# Patient Record
Sex: MALE | Race: WHITE
[De-identification: names, ages, dates, MRNs, and addresses within clinical notes are randomized per-mention and may not be internally consistent; named-entity substitution may affect disease eponyms.]

---

## 2018-07-17 ENCOUNTER — HOSPITAL ENCOUNTER (INPATIENT)
Dept: HOSPITAL 97 - ER | Age: 58
LOS: 5 days | Discharge: HOME | DRG: 246 | End: 2018-07-22
Attending: FAMILY MEDICINE | Admitting: FAMILY MEDICINE
Payer: COMMERCIAL

## 2018-07-17 VITALS — BODY MASS INDEX: 35.9 KG/M2

## 2018-07-17 DIAGNOSIS — F17.210: ICD-10-CM

## 2018-07-17 DIAGNOSIS — I30.9: ICD-10-CM

## 2018-07-17 DIAGNOSIS — Z79.82: ICD-10-CM

## 2018-07-17 DIAGNOSIS — I21.02: Primary | ICD-10-CM

## 2018-07-17 DIAGNOSIS — K80.10: ICD-10-CM

## 2018-07-17 DIAGNOSIS — E78.5: ICD-10-CM

## 2018-07-17 DIAGNOSIS — I25.5: ICD-10-CM

## 2018-07-17 DIAGNOSIS — I50.21: ICD-10-CM

## 2018-07-17 DIAGNOSIS — I11.0: ICD-10-CM

## 2018-07-17 DIAGNOSIS — B19.20: ICD-10-CM

## 2018-07-17 LAB
ALBUMIN SERPL BCP-MCNC: 3.9 G/DL (ref 3.4–5)
ALP SERPL-CCNC: 94 U/L (ref 45–117)
ALT SERPL W P-5'-P-CCNC: 90 U/L (ref 12–78)
AST SERPL W P-5'-P-CCNC: 449 U/L (ref 15–37)
BUN BLD-MCNC: 10 MG/DL (ref 7–18)
COHGB MFR BLDA: 2.3 % (ref 0–1.5)
GLUCOSE SERPLBLD-MCNC: 323 MG/DL (ref 74–106)
HCT VFR BLD CALC: 46.8 % (ref 39.6–49)
INR BLD: 0.97
LYMPHOCYTES # SPEC AUTO: 2.3 K/UL (ref 0.7–4.9)
MAGNESIUM SERPL-MCNC: 2.1 MG/DL (ref 1.8–2.4)
MCH RBC QN AUTO: 29.1 PG (ref 27–35)
MCV RBC: 85.2 FL (ref 80–100)
METHAMPHET UR QL SCN: NEGATIVE
OXYHGB MFR BLDA: 96.5 % (ref 94–97)
PMV BLD: 9.4 FL (ref 7.6–11.3)
POTASSIUM SERPL-SCNC: 4.1 MMOL/L (ref 3.5–5.1)
RBC # BLD: 5.49 M/UL (ref 4.33–5.43)
SAO2 % BLDA: 99.8 % (ref 92–98.5)
THC SERPL-MCNC: NEGATIVE NG/ML
UA DIPSTICK W REFLEX MICRO PNL UR: (no result)

## 2018-07-17 PROCEDURE — 85025 COMPLETE CBC W/AUTO DIFF WBC: CPT

## 2018-07-17 PROCEDURE — 82248 BILIRUBIN DIRECT: CPT

## 2018-07-17 PROCEDURE — 87522 HEPATITIS C REVRS TRNSCRPJ: CPT

## 2018-07-17 PROCEDURE — 71275 CT ANGIOGRAPHY CHEST: CPT

## 2018-07-17 PROCEDURE — 82805 BLOOD GASES W/O2 SATURATION: CPT

## 2018-07-17 PROCEDURE — 80048 BASIC METABOLIC PNL TOTAL CA: CPT

## 2018-07-17 PROCEDURE — 71045 X-RAY EXAM CHEST 1 VIEW: CPT

## 2018-07-17 PROCEDURE — 83036 HEMOGLOBIN GLYCOSYLATED A1C: CPT

## 2018-07-17 PROCEDURE — 85347 COAGULATION TIME ACTIVATED: CPT

## 2018-07-17 PROCEDURE — 85610 PROTHROMBIN TIME: CPT

## 2018-07-17 PROCEDURE — 82962 GLUCOSE BLOOD TEST: CPT

## 2018-07-17 PROCEDURE — 80076 HEPATIC FUNCTION PANEL: CPT

## 2018-07-17 PROCEDURE — 81003 URINALYSIS AUTO W/O SCOPE: CPT

## 2018-07-17 PROCEDURE — 93005 ELECTROCARDIOGRAM TRACING: CPT

## 2018-07-17 PROCEDURE — 76705 ECHO EXAM OF ABDOMEN: CPT

## 2018-07-17 PROCEDURE — 80074 ACUTE HEPATITIS PANEL: CPT

## 2018-07-17 PROCEDURE — 93458 L HRT ARTERY/VENTRICLE ANGIO: CPT

## 2018-07-17 PROCEDURE — 80307 DRUG TEST PRSMV CHEM ANLYZR: CPT

## 2018-07-17 PROCEDURE — 99285 EMERGENCY DEPT VISIT HI MDM: CPT

## 2018-07-17 PROCEDURE — 80053 COMPREHEN METABOLIC PANEL: CPT

## 2018-07-17 PROCEDURE — 83735 ASSAY OF MAGNESIUM: CPT

## 2018-07-17 PROCEDURE — 84484 ASSAY OF TROPONIN QUANT: CPT

## 2018-07-17 PROCEDURE — 92928 PRQ TCAT PLMT NTRAC ST 1 LES: CPT

## 2018-07-17 PROCEDURE — 80061 LIPID PANEL: CPT

## 2018-07-17 PROCEDURE — 94760 N-INVAS EAR/PLS OXIMETRY 1: CPT

## 2018-07-17 PROCEDURE — 84100 ASSAY OF PHOSPHORUS: CPT

## 2018-07-17 PROCEDURE — 93306 TTE W/DOPPLER COMPLETE: CPT

## 2018-07-17 PROCEDURE — 36415 COLL VENOUS BLD VENIPUNCTURE: CPT

## 2018-07-17 PROCEDURE — 74181 MRI ABDOMEN W/O CONTRAST: CPT

## 2018-07-17 PROCEDURE — 97163 PT EVAL HIGH COMPLEX 45 MIN: CPT

## 2018-07-17 PROCEDURE — 84443 ASSAY THYROID STIM HORMONE: CPT

## 2018-07-17 RX ADMIN — ATORVASTATIN CALCIUM SCH MG: 80 TABLET, FILM COATED ORAL at 21:21

## 2018-07-17 RX ADMIN — HUMAN INSULIN SCH UNIT: 100 INJECTION, SOLUTION SUBCUTANEOUS at 21:18

## 2018-07-17 RX ADMIN — HUMAN INSULIN SCH UNIT: 100 INJECTION, SOLUTION SUBCUTANEOUS at 17:36

## 2018-07-17 RX ADMIN — DIAZEPAM PRN MG: 5 TABLET ORAL at 16:09

## 2018-07-17 RX ADMIN — Medication SCH ML: at 21:20

## 2018-07-17 RX ADMIN — MORPHINE SULFATE PRN MG: 4 INJECTION, SOLUTION INTRAMUSCULAR; INTRAVENOUS at 13:51

## 2018-07-17 RX ADMIN — MORPHINE SULFATE PRN MG: 4 INJECTION, SOLUTION INTRAMUSCULAR; INTRAVENOUS at 21:21

## 2018-07-17 NOTE — RAD REPORT
EXAM DESCRIPTION:  RAD - Chest Single View - 7/17/2018 8:34 am

 

CLINICAL HISTORY:  CHEST PAIN

Chest pain.

 

COMPARISON:  No comparisons

 

FINDINGS:  Portable technique limits examination quality.

 

The lungs are grossly clear. The heart is normal in size. No displaced fractures.

 

IMPRESSION:  No acute intrathoracic process suspected.

## 2018-07-17 NOTE — EDPHYS
Physician Documentation                                                                           

 [facility]                                                                

Name: Van Justice                                                                               

Age: 58 yrs                                                                                       

Sex: Male                                                                                         

: 1960                                                                                   

MRN: [id number]                                                                                   

Arrival Date: 2018                                                                          

Time: 08:20                                                                                       

Account#: [id number]                                                                            

Bed 6                                                                                             

Private MD:                                                                                       

ED Physician Alexy Junior                                                                       

HPI:                                                                                              

                                                                                             

08:45 This 58 yrs old  Male presents to ER via Wheelchair with complaints of Chest   gs  

      Pain.                                                                                       

08:45 The patient or guardian reports chest pain that is located primarily in the anterior    gs  

      chest wall. Onset: yesterday. The pain radiates to Associated signs and symptoms:           

      Pertinent positives: shortness of breath. The chest pain is described as a heaviness.       

      Duration: The patient or guardian reports a single episode, that is still ongoing.          

      Modifying factors: The symptoms are alleviated by nothing. Severity of pain: At its         

      worst the pain was severe in the emergency department the pain is unchanged. The            

      patient has not experienced similar symptoms in the past.                                   

                                                                                                  

Historical:                                                                                       

- Allergies:                                                                                      

08:22 NKA;                                                                                    iw  

- Home Meds:                                                                                      

08:22 None [Active];                                                                          iw  

- PMHx:                                                                                           

08:22 None;                                                                                   iw  

- PSHx:                                                                                           

08:22 None;                                                                                   iw  

                                                                                                  

- Immunization history:: Adult Immunizations not up to date.                                      

- Social history:: Smoking status: Patient uses tobacco products, smokes one-half pack            

  cigarettes per day.                                                                             

- Ebola Screening: : Patient negative for fever greater than or equal to 101.5 degrees            

  Fahrenheit, and additional compatible Ebola Virus Disease symptoms Patient denies               

  exposure to infectious person Patient denies travel to an Ebola-affected area in the            

  21 days before illness onset No symptoms or risks identified at this time.                      

                                                                                                  

                                                                                                  

ROS:                                                                                              

08:45 All other systems are negative.                                                         gs  

                                                                                                  

Exam:                                                                                             

08:45 Head/Face:  Normocephalic, atraumatic. Eyes:  Pupils equal round and reactive to light, gs  

      extra-ocular motions intact.  Lids and lashes normal.  Conjunctiva and sclera are           

      non-icteric and not injected.  Cornea within normal limits.  Periorbital areas with no      

      swelling, redness, or edema. ENT:  Nares patent. No nasal discharge, no septal              

      abnormalities noted.  Tympanic membranes are normal and external auditory canals are        

      clear.  Oropharynx with no redness, swelling, or masses, exudates, or evidence of           

      obstruction, uvula midline.  Mucous membranes moist. Neck:  Trachea midline, no             

      thyromegaly or masses palpated, and no cervical lymphadenopathy.  Supple, full range of     

      motion without nuchal rigidity, or vertebral point tenderness.  No Meningismus.             

      Chest/axilla:  Normal chest wall appearance and motion.  Nontender with no deformity.       

      No lesions are appreciated. Cardiovascular:  Regular rate and rhythm with a normal S1       

      and S2.  No gallops, murmurs, or rubs.  Normal PMI, no JVD.  No pulse deficits.             

      Respiratory:  Lungs have equal breath sounds bilaterally, clear to auscultation and         

      percussion.  No rales, rhonchi or wheezes noted.  No increased work of breathing, no        

      retractions or nasal flaring. Abdomen/GI:  Soft, non-tender, with normal bowel sounds.      

      No distension or tympany.  No guarding or rebound.  No evidence of tenderness               

      throughout. Back:  No spinal tenderness.  No costovertebral tenderness.  Full range of      

      motion. Skin:  Warm, dry with normal turgor.  Normal color with no rashes, no lesions,      

      and no evidence of cellulitis. MS/ Extremity:  Pulses equal, no cyanosis.                   

      Neurovascular intact.  Full, normal range of motion. Neuro:  Awake and alert, GCS 15,       

      oriented to person, place, time, and situation.  Cranial nerves II-XII grossly intact.      

      Motor strength 5/5 in all extremities.  Sensory grossly intact.  Cerebellar exam            

      normal.  Normal gait.                                                                       

08:45 Constitutional: The patient appears alert, awake.                                           

08:45 ECG was reviewed by the Attending Physician.                                                

                                                                                                  

Vital Signs:                                                                                      

08:22  / 107; Pulse 87; Resp 20 S; Pulse Ox 96% on R/A; Weight 88.45 kg (R); Pain 8/10; iw  

08:33  / 108; Pulse 86; Resp 20 S; Pulse Ox 97% on R/A; Pain 8/10;                      iw  

08:40  / 94; Pulse 85; Resp 22; Pulse Ox 97% ;                                          jl7 

08:45  / 94;                                                                            jl7 

                                                                                                  

MDM:                                                                                              

08:22 Patient medically screened.                                                               

08:45 Differential diagnosis: acute myocardial infarction, coronary artery disease chest wall gs  

      pain, congestive heart failure. Data reviewed: vital signs, nurses notes.                   

                                                                                                  

                                                                                             

08:26 Order name: Basic Metabolic Panel                                                         

                                                                                             

08:26 Order name: CBC with Diff                                                                 

                                                                                             

08:26 Order name: LFT's                                                                         

                                                                                             

08:26 Order name: Magnesium                                                                     

                                                                                             

08:26 Order name: PT-INR                                                                        

                                                                                             

08:26 Order name: Troponin (emerg Dept Use Only)                                                

                                                                                             

08:26 Order name: XRAY Chest (1 view)                                                           

                                                                                             

08:26 Order name: EKG; Complete Time: 08:27                                                     

                                                                                             

08:26 Order name: Cardiac monitoring; Complete Time: 08:36                                      

                                                                                             

08:26 Order name: EKG - Nurse/Tech; Complete Time: 08:45                                        

                                                                                             

08:26 Order name: IV Saline Lock; Complete Time: 08:36                                          

                                                                                             

08:26 Order name: Labs collected and sent; Complete Time: 08:36                                 

                                                                                             

08:26 Order name: O2 Per Protocol; Complete Time: 08:45                                         

                                                                                             

08:26 Order name: O2 Sat Monitoring; Complete Time: 08:45                                       

                                                                                                  

EC:45 Rate is 87 beats/min. Rhythm is regular. NE interval is normal. QRS interval is normal. gs  

      ST Segment is elevated in leads I, V2, V3, V4, V5, V6. Clinical impression: Anterior MI     

      - acute. Interpreted by me.                                                                 

                                                                                                  

Administered Medications:                                                                         

08:28 Drug: Aspirin Chewable Tablet 324 mg Route: PO;                                         iw  

08:56 Follow up: Response: Other; Pt moved to cath lab                                        jl7 

08:34 Drug: Heparin (MI-Bolus No thrombolytic) - HEParin 60 units/kg {Co-Signature: ae1       jl7 

      (Kurt Saavedra RN).} Route: IVP; Site: right antecubital;                                  

08:57 Follow up: Response: Pt moved to cath lab                                               jl7 

08:36 Drug: morphine 8 mg Route: IVP; Site: left hand;                                        jl7 

08:57 Follow up: Response: Pt moved to cath lab                                               jl7 

08:36 Drug: Heparin (MI Drip) 12 units/kg/hr - (HEParin 20700 units, D5W 500 ml)              jl7 

      {Co-Signature: ae1 (Kurt Saavedra RN).} Route: IV; Rate: calculated rate; Site: right      

      antecubital;                                                                                

08:58 Follow up: IV Status: Infusion continued upon admission                                 jl7 

08:40 Drug: Lopressor 5 mg Route: IVP; Site: left hand;                                       jl7 

08:57 Follow up: Response: Pt moved to cath lab                                               jl7 

                                                                                                  

                                                                                                  

Disposition:                                                                                      

18 08:31 Hospitalization ordered by Lian Singh for Inpatient Admission. Preliminary     

  diagnosis is ST elevation (STEMI) myocardial infarction of anterior wall.                       

- Bed requested for Intensive Care Unit.                                                          

- Status is Inpatient Admission.                                                              jl7 

- Condition is Stable.                                                                            

- Problem is new.                                                                                 

- Symptoms have improved.                                                                         

UTI on Admission? No                                                                              

                                                                                                  

                                                                                                  

                                                                                                  

Signatures:                                                                                       

Dispatcher [employer]                           EDMS                                                 

Kavita Pillai RN RN                                                      

Ruddy Valles RN RN   7                                                  

Alexy Junior MD MD                                                      

Kurt Saavedra RN                            ae1                                                  

                                                                                                  

Corrections: (The following items were deleted from the chart)                                    

08:45 08:26 Urine Dipstick-Ancillary ordered. [facility]  

08:59 08:31 Hospitalization Ordered by Lian Singh MD for Inpatient Admission. Preliminary  7 

      diagnosis is ST elevation (STEMI) myocardial infarction of anterior wall. Bed requested     

      for Intensive Care Unit. Status is Inpatient Admission. Condition is Stable. Problem is     

      new. Symptoms have improved. UTI on Admission? No.                                        

                                                                                                  

**************************************************************************************************

## 2018-07-17 NOTE — EKG
Test Date:    2018-07-17               Test Time:    10:37:01

Technician:   DAVID                                     

                                                     

MEASUREMENT RESULTS:                                       

Intervals:                                           

Rate:         88                                     

WA:           142                                    

QRSD:         92                                     

QT:           362                                    

QTc:          438                                    

Axis:                                                

P:            53                                     

WA:           142                                    

QRS:          77                                     

T:            52                                     

                                                     

INTERPRETIVE STATEMENTS:                                       

                                                     

Normal sinus rhythm

Anterolateral infarct, possibly acute

** ** ACUTE MI ** **

Abnormal ECG

Compared to ECG 07/17/2018 08:18:30

No significant changes



Electronically Signed On 07-17-18 14:50:15 CDT by Dami Sandoval

## 2018-07-17 NOTE — ER
Nurse's Notes                                                                                     

 [facility]                                                                

Name: Van Justice                                                                               

Age: 58 yrs                                                                                       

Sex: Male                                                                                         

: 1960                                                                                   

MRN: [id number]                                                                                   

Arrival Date: 2018                                                                          

Time: 08:20                                                                                       

Account#: [id number]                                                                            

Bed 6                                                                                             

Private MD:                                                                                       

Diagnosis: ST elevation (STEMI) myocardial infarction of anterior wall                            

                                                                                                  

Presentation:                                                                                     

                                                                                             

08:20 Presenting complaint: Patient states: left sided chest pain radiating through his back  iw  

      started yesterday afternoon, pain constant, pressure, 8/10, no cardiac hx. Transition       

      of care: patient was not received from another setting of care. Onset of symptoms was       

      2018. Risk Assessment: Do you want to hurt yourself or someone else? Patient       

      reports no desire to harm self or others. Initial Sepsis Screen: Does the patient meet      

      any 2 criteria? No. Patient's initial sepsis screen is negative. Does the patient have      

      a suspected source of infection? No. Patient's initial sepsis screen is negative. Care      

      prior to arrival: Medication(s) given:.                                                     

08:20 Method Of Arrival: Wheelchair                                                           iw  

08:20 Acuity: MEAGHAN 2                                                                           iw  

                                                                                                  

Historical:                                                                                       

- Allergies:                                                                                      

08:22 NKA;                                                                                    iw  

- Home Meds:                                                                                      

08:22 None [Active];                                                                          iw  

- PMHx:                                                                                           

08:22 None;                                                                                   iw  

- PSHx:                                                                                           

08:22 None;                                                                                   iw  

                                                                                                  

- Immunization history:: Adult Immunizations not up to date.                                      

- Social history:: Smoking status: Patient uses tobacco products, smokes one-half pack            

  cigarettes per day.                                                                             

- Ebola Screening: : Patient negative for fever greater than or equal to 101.5 degrees            

  Fahrenheit, and additional compatible Ebola Virus Disease symptoms Patient denies               

  exposure to infectious person Patient denies travel to an Ebola-affected area in the            

  21 days before illness onset No symptoms or risks identified at this time.                      

                                                                                                  

                                                                                                  

Screenin:34 Abuse screen: Denies threats or abuse. Denies injuries from another. Nutritional        iw  

      screening: No deficits noted. Tuberculosis screening: No symptoms or risk factors           

      identified. Fall Risk IV access (20 points).                                                

                                                                                                  

Assessment:                                                                                       

08:20 General: Appears distressed, uncomfortable, Behavior is cooperative. Pain: Complains of iw  

      pain in anterior aspect of left upper chest and left breast Pain radiates to back Pain      

      currently is 8 out of 10 on a pain scale. Quality of pain is described as pressure,         

      Pain began 1 day ago. Is continuous. Neuro: Level of Consciousness is awake, alert,         

      obeys commands, Oriented to person, place, time, situation, Moves all extremities. Full     

      function. Cardiovascular: Reports chest pain, Denies nausea, vomiting, Capillary refill     

      < 3 seconds in bilateral fingers Patient's skin is warm and dry. Respiratory:               

      Respiratory effort is even, unlabored, Respiratory pattern is regular, symmetrical. GI:     

      Patient currently denies vomiting. Derm: Skin is pink, warm \T\ dry. normal.                

      Musculoskeletal: Range of motion: intact in all extremities.                                

08:40 Reassessment: Cath Lab nurse at bedside to receive report, pt placed on [employer]        iw  

      monitor, placed on O2.                                                                      

                                                                                                  

Vital Signs:                                                                                      

08:22  / 107; Pulse 87; Resp 20 S; Pulse Ox 96% on R/A; Weight 88.45 kg (R); Pain 8/10; iw  

08:33  / 108; Pulse 86; Resp 20 S; Pulse Ox 97% on R/A; Pain 8/10;                      iw  

08:40  / 94; Pulse 85; Resp 22; Pulse Ox 97% ;                                          jl7 

08:45  / 94;                                                                            jl7 

                                                                                                  

ED Course:                                                                                        

08:20 Patient arrived in ED.                                                                  hj  

08:22 Alexy Junior MD is Attending Physician.                                              gs  

08:27 Triage completed.                                                                       iw  

08:30 Lian Singh MD is Hospitalizing Provider.                                           gs  

08:30 Inserted saline lock: 20 gauge in right antecubital area, using aseptic technique.      iw  

      Blood collected. IV inserted by REJI Fox.                                                 

08:33 X-ray completed. Portable x-ray completed in exam room.                                 1 

08:34 XRAY Chest (1 view) In Process Unspecified.                                             EDMS

08:34 Inserted saline lock: 22 gauge in left hand, using aseptic technique. IV inserted by    yair Fox RN.                                                                                 

08:36 Patient has correct armband on for positive identification. Placed in gown. Bed in low  iw  

      position. Call light in reach. Side rails up X2. Adult w/ patient. Cardiac monitor on.      

      Pulse ox on. NIBP on.                                                                       

08:41 EKG done, by EKG tech. reviewed by Alexy Junior MD.                                    at1 

08:45 Kavita Pillai RN is Primary Nurse.                                                   iw  

08:46 No provider procedures requiring assistance completed. Patient admitted, IV remains in  iw  

      place.                                                                                      

08:55 Arm band placed on right wrist.                                                         jl7 

                                                                                                  

Administered Medications:                                                                         

08:28 Drug: Aspirin Chewable Tablet 324 mg Route: PO;                                         iw  

08:56 Follow up: Response: Other; Pt moved to cath lab                                        jl7 

08:34 Drug: Heparin (MI-Bolus No thrombolytic) - HEParin 60 units/kg {Co-Signature: ae1       jl7 

      (Kurt Saavedra RN).} Route: IVP; Site: right antecubital;                                  

08:57 Follow up: Response: Pt moved to cath lab                                               jl7 

08:36 Drug: morphine 8 mg Route: IVP; Site: left hand;                                        jl7 

08:57 Follow up: Response: Pt moved to cath lab                                               jl7 

08:36 Drug: Heparin (MI Drip) 12 units/kg/hr - (HEParin 20779 units, D5W 500 ml)              jl7 

      {Co-Signature: ae1 (Kurt Saavedra RN).} Route: IV; Rate: calculated rate; Site: right      

      antecubital;                                                                                

08:58 Follow up: IV Status: Infusion continued upon admission                                 jl7 

08:40 Drug: Lopressor 5 mg Route: IVP; Site: left hand;                                       jl7 

08:57 Follow up: Response: Pt moved to cath lab                                               jl7 

                                                                                                  

                                                                                                  

Outcome:                                                                                          

08:31 Decision to Hospitalize by Provider.                                                      

08:58 Admitted to Cath Lab accompanied by nurse, family with patient, via stretcher, with     jl7 

      oxygen, on monitor, with chart, Other Report given to cath lab nurse                        

08:58 Condition: stable                                                                           

08:58 Instructed on the need for admit, Demonstrated understanding of instructions.               

08:59 Patient left the ED.                                                                    [facility] 

                                                                                                  

Signatures:                                                                                       

Dispatcher [employer]                           EDMS                                                 

Rand Herbert                               mh1                                                  

Kavita Pillai, RN                     RN                                                      

Chiquis myers, EKG Tech              EKG Tat1                                                  

Alec Armstrong RN RN hj Leal, Jahala, RN RN   [facility]                                                  

Alexy Junior MD MD                                                      

Kurt Saavedra RN                            ae1                                                  

                                                                                                  

Corrections: (The following items were deleted from the chart)                                    

08:29 08:22  / 107; Pulse 87bpm; Resp 20bpm; Spontaneous; Pulse Ox 96% RA; Pain 8/10; iwiw  

                                                                                                  

**************************************************************************************************

## 2018-07-17 NOTE — P.HP
Certification for Inpatient


Patient admitted to: Inpatient


With expected LOS: >2 Midnights


Patient will require the following post-hospital care: None


Practitioner: I am a practitioner with admitting privileges, knowledge of 

patient current condition, hospital course, and medical plan of care.


Services: Services provided to patient in accordance with Admission 

requirements found in Title 42 Section 412.3 of the Code of Federal Regulations





Patient History


Date of Service: 07/17/18


Primary Care Provider: None


Reason for admission: STEMI


History of Present Illness: 





This is a 58-year-old male with no significant past medical history who 

presented to the ED complaining of having some chest pain.  Patient stated that 

his chest pain started yesterday.  Chest pain was in the left lateral side 

which is radiating back to his back and up his neck as well.  Patient stated 

that he was resting at that time the chest pain started at its worse patient 

was 8/10 and felt like heaviness his chest area.  Patient also had associated 

nausea and feeling of on using his with the chest pain.  Patient has never had 

any other past medical history number has anything like this happen to him 

before.  Patient denies having any alcohol positive for tobacco.





In the ER patient was found to have STEMI on the EKG and troponin was elevated 

to 105 and thus was admitted for further care. 


Allergies





No Known Allergies Allergy (Unverified 07/17/18 09:03)


 





Home medications list reviewed: Yes


Home Medications: 








Aspirin [Aspirin EC 81 MG] 81 mg PO DAILY 07/17/18 








- Past Medical/Surgical History


Has patient received pneumonia vaccine in the past: No


Diabetic: No


Past Medical History: Patient denies medical history


Past Surgical History: Patient denies surgical history





- Family History


Family History: Reviewed- Non-Contributory





- Social History


Smoking Status: Current every day smoker


Counseled patient to stop smoking for: more than 10 minutes


Smoking therapy provided: Yes


Patient receptive to therapy: Yes


Alcohol use: No


CD- Drugs: No


Caffeine use: No





Review of Systems


General: As per HPI





Physical Examination





- Vital Signs


Temperature: 97.0 F


Blood Pressure: 121/92


Pulse: 88


Respirations: 23


Pulse Ox (%): 97





- Physical Exam


General: Alert, Oriented x3, Moderate distress


HEENT: Atraumatic


Neck: Supple


Respiratory: Clear to auscultation bilaterally, Normal air movement


Cardiovascular: Regular rate/rhythm, Normal S1 S2


Gastrointestinal: Normal bowel sounds, Soft and benign, Non-distended, No 

tenderness


Musculoskeletal: No tenderness


Integumentary: No rashes


Neurological: Normal gait, Normal speech, Normal strength at 5/5 x4 extr, 

Normal tone, Normal affect


Lymphatics: No axilla or inguinal lymphadenopathy





- Studies


Laboratory Data (last 24 hrs)





07/17/18 08:25: PT 11.4, INR 0.97


07/17/18 08:25: WBC 16.2 H, Hgb 16.0, Hct 46.8, Plt Count 237


07/17/18 08:25: Sodium 135 L, Potassium 4.1, BUN 10, Creatinine 0.70, Glucose 

323 H, Magnesium 2.1, Total Bilirubin 0.5,  H*, ALT 90 H, Alkaline 

Phosphatase 94








Assessment and Plan





- Problems (Diagnosis)


(1) STEMI (ST elevation myocardial infarction)


Current Visit: Yes   Status: Acute   


Plan: 


Pt with ST elevation in Lateral Leads and troponin elevated to 105


-Cardiology consulted from the ER. 


   -Reccs Cath Lab 


-S/p heart Cath with Stent Placement in the LAD 


   -Occlusion of the LAD


-Transfer to ICU for close monitoring 


-BB, ASA, Effient, Statin. 


-Nitroglcerin for Angina PRN


-Will check lipid panel and hga1c   


Qualifiers: 


   Involved coronary artery: LAD coronary artery   Qualified Code(s): I21.02 - 

ST elevation (STEMI) myocardial infarction involving left anterior descending 

coronary artery   


Discharge Plan: Other


Plan to discharge in: 72 Hours





- Advance Directives


Does patient have a Living Will: No


Does patient have a Durable POA for Healthcare: No





- Code Status/Comfort Care


Code Status Assessed: Yes


Critical Care: Yes

## 2018-07-17 NOTE — EKG
Test Date:    2018-07-17               Test Time:    08:18:30

Technician:   DAVID                                     

                                                     

MEASUREMENT RESULTS:                                       

Intervals:                                           

Rate:         87                                     

WA:           146                                    

QRSD:         88                                     

QT:           346                                    

QTc:          416                                    

Axis:                                                

P:            50                                     

WA:           146                                    

QRS:          13                                     

T:            37                                     

                                                     

INTERPRETIVE STATEMENTS:                                       

                                                     

Normal sinus rhythm

Low voltage QRS

Anteroseptal infarct, possibly acute

Inferolateral injury pattern

** ** ACUTE MI / STEMI ** **

Abnormal ECG

No previous ECG available for comparison



Electronically Signed On 07-17-18 14:50:19 CDT by Dami Sandoval

## 2018-07-18 LAB
ALBUMIN SERPL BCP-MCNC: 3.2 G/DL (ref 3.4–5)
ALP SERPL-CCNC: 83 U/L (ref 45–117)
ALT SERPL W P-5'-P-CCNC: 93 U/L (ref 12–78)
AST SERPL W P-5'-P-CCNC: 218 U/L (ref 15–37)
BUN BLD-MCNC: 18 MG/DL (ref 7–18)
GLUCOSE SERPLBLD-MCNC: 408 MG/DL (ref 74–106)
HCT VFR BLD CALC: 45 % (ref 39.6–49)
HDLC SERPL-MCNC: 55 MG/DL (ref 40–60)
LDLC SERPL CALC-MCNC: 79 MG/DL (ref ?–130)
LYMPHOCYTES # SPEC AUTO: 2.5 K/UL (ref 0.7–4.9)
MAGNESIUM SERPL-MCNC: 1.9 MG/DL (ref 1.8–2.4)
MCH RBC QN AUTO: 29.5 PG (ref 27–35)
MCV RBC: 86.5 FL (ref 80–100)
MORPHOLOGY BLD-IMP: (no result)
PMV BLD: 9.7 FL (ref 7.6–11.3)
POTASSIUM SERPL-SCNC: 5.3 MMOL/L (ref 3.5–5.1)
RBC # BLD: 5.2 M/UL (ref 4.33–5.43)

## 2018-07-18 PROCEDURE — 4A023N7 MEASUREMENT OF CARDIAC SAMPLING AND PRESSURE, LEFT HEART, PERCUTANEOUS APPROACH: ICD-10-PCS

## 2018-07-18 PROCEDURE — B2111ZZ FLUOROSCOPY OF MULTIPLE CORONARY ARTERIES USING LOW OSMOLAR CONTRAST: ICD-10-PCS

## 2018-07-18 PROCEDURE — 027034Z DILATION OF CORONARY ARTERY, ONE ARTERY WITH DRUG-ELUTING INTRALUMINAL DEVICE, PERCUTANEOUS APPROACH: ICD-10-PCS

## 2018-07-18 PROCEDURE — B2151ZZ FLUOROSCOPY OF LEFT HEART USING LOW OSMOLAR CONTRAST: ICD-10-PCS

## 2018-07-18 RX ADMIN — CEFTRIAXONE SCH MLS: 1 INJECTION, SOLUTION INTRAVENOUS at 08:45

## 2018-07-18 RX ADMIN — Medication SCH ML: at 08:46

## 2018-07-18 RX ADMIN — MORPHINE SULFATE PRN MG: 4 INJECTION, SOLUTION INTRAMUSCULAR; INTRAVENOUS at 08:41

## 2018-07-18 RX ADMIN — MORPHINE SULFATE PRN MG: 4 INJECTION, SOLUTION INTRAMUSCULAR; INTRAVENOUS at 15:49

## 2018-07-18 RX ADMIN — Medication SCH ML: at 20:23

## 2018-07-18 RX ADMIN — HUMAN INSULIN SCH UNIT: 100 INJECTION, SOLUTION SUBCUTANEOUS at 17:24

## 2018-07-18 RX ADMIN — ATORVASTATIN CALCIUM SCH MG: 80 TABLET, FILM COATED ORAL at 20:22

## 2018-07-18 RX ADMIN — HUMAN INSULIN SCH UNIT: 100 INJECTION, SOLUTION SUBCUTANEOUS at 20:22

## 2018-07-18 RX ADMIN — MORPHINE SULFATE PRN MG: 4 INJECTION, SOLUTION INTRAMUSCULAR; INTRAVENOUS at 05:03

## 2018-07-18 RX ADMIN — HUMAN INSULIN SCH UNIT: 100 INJECTION, SOLUTION SUBCUTANEOUS at 12:05

## 2018-07-18 RX ADMIN — MORPHINE SULFATE PRN MG: 4 INJECTION, SOLUTION INTRAMUSCULAR; INTRAVENOUS at 20:23

## 2018-07-18 RX ADMIN — HUMAN INSULIN SCH UNIT: 100 INJECTION, SOLUTION SUBCUTANEOUS at 08:45

## 2018-07-18 RX ADMIN — PRASUGREL SCH MG: 10 TABLET, FILM COATED ORAL at 08:45

## 2018-07-18 RX ADMIN — METOPROLOL SUCCINATE SCH MG: 50 TABLET, EXTENDED RELEASE ORAL at 08:40

## 2018-07-18 RX ADMIN — SODIUM CHLORIDE SCH: 0.9 INJECTION, SOLUTION INTRAVENOUS at 11:00

## 2018-07-18 RX ADMIN — SODIUM CHLORIDE SCH MLS: 0.9 INJECTION, SOLUTION INTRAVENOUS at 20:22

## 2018-07-18 RX ADMIN — ASPIRIN 81 MG SCH MG: 81 TABLET ORAL at 08:42

## 2018-07-18 NOTE — CON
History Of Present Illness:  Mr. Justice is a 58-year-old white male, with a history of tobacco use,
 hypertension, dyslipidemia, no previous cardiac history, came into the emergency room with about a d
ay or 2 of constant back pain, was found to have ST elevation on the EKG.  Denied PND, orthopnea, but
 does have some slight pedal edema, some nausea, some diaphoresis and some shortness of breath.  He w
as brought to the cath lab emergently for a heart catheterization and possible intervention.



Past Medical History:  Negative.



Allergies:  NONE.



Review of Systems:

Negative.



Social History:  Positive for tobacco.



Family History:  Positive for heart disease.



Physical Examination:

Vital Signs:  When he was in the cath lab, his vital signs were stable, he was afebrile, he was in pa
in. 

HEENT:  Negative. 

Neck:  Supple.  No bruit. 

Chest:  Clear. 

Cardiac:  Revealed a regular rhythm and rate.  No murmurs, gallops, or rubs. 

Abdomen:  Benign. 

Extremities:  Revealed no clubbing, cyanosis, or edema.



Diagnostic Data:  Showed elevated troponin, abnormal EKG with ST elevation, V1 to V4, otherwise was n
ormal.



Impression And Plan:  Acute anterior myocardial infarction.  Plan for emergency heart catheterization
 and possible intervention.  He is now on aspirin, received heparin and beta-blockers.





NB/AYANNA

DD:  07/18/2018 07:25:51Voice ID:  [id number]

DT:  07/18/2018 12:21:04Report ID:  [id number]

## 2018-07-18 NOTE — P.PN
Subjective


Date of Service: 07/18/18


Primary Care Provider: None


Chief Complaint: STEMI


Patient seen and examined at bedside with RN.  Chart reviewed.  Currently 

patient complains of having some back pain which is radiating to his friend.  

Cardiology see all the patient is morning.  Case discussed with cardiology.  

Patient okay to be transferred to the regular floor.








Review of Systems


General: As per HPI





Physical Examination





- Vital Signs


Temperature: 97.0 F


Blood Pressure: 125/96


Pulse: 103


Respirations: 28


Pulse Ox (%): 99





- Physical Exam


General: Alert, In no apparent distress


HEENT: Atraumatic, PERRLA, EOMI


Neck: Supple, JVD not distended


Respiratory: Clear to auscultation bilaterally, Normal air movement


Cardiovascular: Regular rate/rhythm, Normal S1 S2


Gastrointestinal: Normal bowel sounds, No tenderness


Musculoskeletal: No tenderness


Integumentary: No rashes


Neurological: Normal speech, Normal tone, Normal affect


Lymphatics: No axilla or inguinal lymphadenopathy





- Studies


Medications List Reviewed: Yes





Assessment & Plan





- Problems (Diagnosis)


(1) STEMI (ST elevation myocardial infarction)


Onset Date: 07/18/18   Current Visit: Yes   Status: Acute   


Plan: 


Pt with ST elevation in Lateral Leads and troponin elevated to 105


-Cardiology consulted. Appreciate Reccs 


-S/p heart Cath with Stent Placement in the LAD 


   -Occlusion of the LAD


-BB, ASA, Effient, Statin. 


-Nitroglcerin for Angina PRN


-Will check lipid panel and hga1c


-ECHO pending as well.    


Qualifiers: 


   Involved coronary artery: LAD coronary artery   Qualified Code(s): I21.02 - 

ST elevation (STEMI) myocardial infarction involving left anterior descending 

coronary artery   





(2) Pericarditis as complication of acute myocardial infarction


Current Visit: Yes   Status: Acute   


Plan: 


Pleuretic Chest pain 


-Possible Pericarditis 


-ECHO pending 


-Started on Colchicine





Discharge Plan: Home


Plan to discharge in: 48 Hours





- Code Status/Comfort Care


Code Status Assessed: Yes


Critical Care: Yes

## 2018-07-18 NOTE — OP
Surgeon:  Dami Sandoval MD



Assistant:  Scarlet Sandhu.



Description Of Procedure:  The patient is a 58 years old, came in with an acute anterior MI, brought 
from the emergency room straight to the cath lab for intervention.  A 6-Portuguese sheath was introduced 
in the right common femoral artery.  Jared catheters were used for diagnostic purposes.  He was fou
nd to have a normal RCA, normal circumflex, and a totally occluded LAD after the first diagonal and w
as a large vessel.  The patient had been given 4 mg of Versed and 50 of fentanyl for sedation.  A Cou
gar wire was able to cross the lesion successfully.  This was pre-dilated with a 3.5 x 15 Emerge ball
oon, which established ALYSIA 2 flow in the LAD.  Following that, a 3.5 x 16 Synergy stent was placed w
ith 0% residual with excellent results.  The patient did very well initially and then he started havi
ng chest pain again, so I went back in after the procedure was done and __________ LAD and found that
 his stent was wide open.  He received Angiomax during the procedure.  He received Effient and aspiri
n.  He will be placed on beta-blocker, statin, aspirin, and Effient and observe at least for the next
 day or 2.  Ventriculogram was done showing severe hypokinesis of the anterior apical wall.  I was ho
ping that this will slowly recovers.  He may need to have some ACE inhibitors as well before he goes 
home in addition to the beta-blockers.  The case was discussed with Dr. Singh.  Total conscious sedat
ion was 1 hour.



Final Diagnosis:  Acute myocardial infarction, status post emergency stent in the left anterior desce
nding.





NB/AYANNA

DD:  07/18/2018 07:29:09Voice ID:  [id number]

DT:  07/18/2018 18:04:29Report ID:  [id number]

## 2018-07-18 NOTE — EKG
Test Date:    2018-07-18               Test Time:    07:44:26

Technician:   DAVID                                     

                                                     

MEASUREMENT RESULTS:                                       

Intervals:                                           

Rate:         104                                    

MD:           136                                    

QRSD:         90                                     

QT:           310                                    

QTc:          407                                    

Axis:                                                

P:            48                                     

MD:           136                                    

QRS:          88                                     

T:            62                                     

                                                     

INTERPRETIVE STATEMENTS:                                       

                                                     

Sinus tachycardia

Low voltage QRS

Anteroseptal infarct, cited previously

Abnormal ECG

Compared to ECG 07/17/2018 10:37:01

Low QRS voltage now present

Sinus rhythm no longer present

Myocardial infarct finding still present



Electronically Signed On 07-18-18 09:36:06 CDT by Jerod Lockett

## 2018-07-18 NOTE — PN
Subjective:  Mr. Justice is a 58-year-old man.  Yesterday, he had an LAD stent placed for an ST-elev
ation MI.  His symptoms of chest pain actually began 28 hours before he presented.  He still has pleu
ritic chest pain.  His ST-elevations never normalized after this, so he probably has an LV aneurysm. 
 This certainly was present on the LV angiogram.  We are going to do an echocardiogram today, see if 
there is a pericardial effusion.  Given colchicine in addition to aspirin, Effient and other of the u
sual medicines we gave after an MI, and recommended against giving any steroids.  The amount of pain 
he has is pleuritic, so we will get a chest x-ray to see if perhaps he has developed pneumonia.  He i
s coughing a fair amount.  He does not have a fever.  His white blood cell count is elevated today.





CRISTOPHER/AYANNA

DD:  07/18/2018 08:14:18Voice ID:  [id number]

DT:  07/18/2018 12:37:44Report ID:  [id number]

## 2018-07-18 NOTE — RAD REPORT
EXAM DESCRIPTION:  RAD - Chest Single View - 7/18/2018 10:19 am

 

CLINICAL HISTORY:  Myocardial infarction

 

COMPARISON:  July 17

 

TECHNIQUE:  AP portable chest image was obtained 1007 hours .

 

FINDINGS:  No interstitial or alveolar edema pattern. Heart size and vasculature normal range for por
table imaging. Trachea is midline. No measurable pleural effusion and no pneumothorax. No acute bone 
finding. Right shoulder degenerative change and old left clavicle fracture change noted. No acute aor
tic findings suspected.

 

IMPRESSION:  No failure or other acute cardiopulmonary finding.

 

No significant change from July 17.

## 2018-07-18 NOTE — ECHO
HEIGHT: 5 ft 4 in   WEIGHT: 209 lb 4 oz   DATE OF STUDY: 07/18/2018   REFER DR: Jerod Lockett MD

2-DIMENSIONAL: YES

     M.MODE: YES

 DOPPLER: YES

COLOR FLOW: YES



                    TDS:  

PORTABLE: YES

 DEFINITY:  

BUBBLE STUDY: 





DIAGNOSIS:  POSSIBLE MYOCARDIAL INFARCTION, CHEST PAIN, PERICARDITIS



CARDIAC HISTORY:  

CATHERIZATION: NO

SURGERY: NO

PROSTHETIC VALVE: NO

PACEMAKER: NO





MEASUREMENTS (cm)

    DIASTOLIC (NORMALS)             SYSTOLIC (NORMALS)

IVSd                 1.0 (0.6-1.2)                    LA Diam 2.8 (1.9-4.0)     LVEF       
  31%  

LVIDd               5.4 (3.5-5.7)                        LVIDs      4.7 (2.0-3.5)     %FS  
        14%

LVPWd             1.2 (0.6-1.2)

Ao Diam           3.1 (2.0-3.7)



2 DIMENSIONAL ASSESSMENT:

RIGHT ATRIUM:                   NORMAL

LEFT ATRIUM:       NORMAL



RIGHT VENTRICLE:            NORMAL

LEFT VENTRICLE: NORMAL



TRICUSPID VALVE:             NORMAL

MITRAL VALVE:     NORMAL



PULMONIC VALVE:             NORMAL

AORTIC VALVE:     



PERICARDIAL EFFUSION: SMALL

AORTIC ROOT:      





LEFT VENTRICULAR WALL MOTION:     ANTERIOR APICAL SEPTAL AKINESIS



DOPPLER/COLOR FLOW:     IMPAIRED LEFT VENTRCULAR RELAXATION



COMMENTS:      DEPRESSED LEFT VENTRICULAR EJECTON FRACTION WITH LARGE ANTERIOSEPTAL, 
APICAL INFERIOR SMALL PERICARDIAL EFFUSION.  IMPAIRED LEFT VENTRICULAR RELAXATION.



TECHNOLOGIST:   KETTY BEDOLLA

## 2018-07-19 LAB
ALBUMIN SERPL BCP-MCNC: 2.8 G/DL (ref 3.4–5)
ALP SERPL-CCNC: 83 U/L (ref 45–117)
ALT SERPL W P-5'-P-CCNC: 371 U/L (ref 12–78)
AST SERPL W P-5'-P-CCNC: 405 U/L (ref 15–37)
BUN BLD-MCNC: 35 MG/DL (ref 7–18)
GLUCOSE SERPLBLD-MCNC: 283 MG/DL (ref 74–106)
HCT VFR BLD CALC: 42 % (ref 39.6–49)
LYMPHOCYTES # SPEC AUTO: 3.3 K/UL (ref 0.7–4.9)
MAGNESIUM SERPL-MCNC: 2.2 MG/DL (ref 1.8–2.4)
MCH RBC QN AUTO: 29.7 PG (ref 27–35)
MCV RBC: 84.1 FL (ref 80–100)
PMV BLD: 9.7 FL (ref 7.6–11.3)
POTASSIUM SERPL-SCNC: 4.4 MMOL/L (ref 3.5–5.1)
RBC # BLD: 4.99 M/UL (ref 4.33–5.43)

## 2018-07-19 RX ADMIN — MORPHINE SULFATE PRN MG: 4 INJECTION, SOLUTION INTRAMUSCULAR; INTRAVENOUS at 22:21

## 2018-07-19 RX ADMIN — HUMAN INSULIN SCH UNIT: 100 INJECTION, SOLUTION SUBCUTANEOUS at 12:20

## 2018-07-19 RX ADMIN — MORPHINE SULFATE PRN MG: 4 INJECTION, SOLUTION INTRAMUSCULAR; INTRAVENOUS at 13:31

## 2018-07-19 RX ADMIN — MORPHINE SULFATE PRN MG: 4 INJECTION, SOLUTION INTRAMUSCULAR; INTRAVENOUS at 04:30

## 2018-07-19 RX ADMIN — ACETAMINOPHEN PRN MG: 500 TABLET, FILM COATED ORAL at 21:21

## 2018-07-19 RX ADMIN — METRONIDAZOLE SCH MLS: 500 INJECTION, SOLUTION INTRAVENOUS at 16:57

## 2018-07-19 RX ADMIN — HUMAN INSULIN SCH: 100 INJECTION, SOLUTION SUBCUTANEOUS at 21:00

## 2018-07-19 RX ADMIN — MORPHINE SULFATE PRN MG: 4 INJECTION, SOLUTION INTRAMUSCULAR; INTRAVENOUS at 08:34

## 2018-07-19 RX ADMIN — MORPHINE SULFATE PRN MG: 4 INJECTION, SOLUTION INTRAMUSCULAR; INTRAVENOUS at 00:27

## 2018-07-19 RX ADMIN — ASPIRIN 81 MG SCH MG: 81 TABLET ORAL at 08:35

## 2018-07-19 RX ADMIN — PRASUGREL SCH MG: 10 TABLET, FILM COATED ORAL at 09:18

## 2018-07-19 RX ADMIN — SODIUM CHLORIDE SCH MLS: 0.9 INJECTION, SOLUTION INTRAVENOUS at 13:29

## 2018-07-19 RX ADMIN — HUMAN INSULIN SCH UNIT: 100 INJECTION, SOLUTION SUBCUTANEOUS at 16:57

## 2018-07-19 RX ADMIN — HUMAN INSULIN SCH UNIT: 100 INJECTION, SOLUTION SUBCUTANEOUS at 08:33

## 2018-07-19 RX ADMIN — Medication SCH: at 08:35

## 2018-07-19 RX ADMIN — CEFTRIAXONE SCH MLS: 1 INJECTION, SOLUTION INTRAVENOUS at 08:36

## 2018-07-19 RX ADMIN — CIPROFLOXACIN SCH MLS: 2 INJECTION, SOLUTION INTRAVENOUS at 20:55

## 2018-07-19 RX ADMIN — MORPHINE SULFATE PRN MG: 4 INJECTION, SOLUTION INTRAMUSCULAR; INTRAVENOUS at 18:01

## 2018-07-19 RX ADMIN — ATORVASTATIN CALCIUM SCH MG: 80 TABLET, FILM COATED ORAL at 20:55

## 2018-07-19 RX ADMIN — Medication SCH: at 20:55

## 2018-07-19 RX ADMIN — CEFOXITIN SODIUM SCH MLS: 1 INJECTION, SOLUTION INTRAVENOUS at 17:02

## 2018-07-19 RX ADMIN — METOPROLOL SUCCINATE SCH MG: 50 TABLET, EXTENDED RELEASE ORAL at 08:34

## 2018-07-19 NOTE — CON
Date of Consultation:  07/19/2018



Reason:  Cholelithiasis.



History Of Present Illness:  The patient is a 58-year-old gentleman, who was admitted 2 days ago with
 ST-elevation MI.  He came in with chest pain.  Currently he is complaining of some epigastric pain g
oing to the back, and he had a stent placed yesterday.  He is on anti-platelet drugs and because he h
ad an ultrasound done, he had gallstones, I was asked to evaluate.  Currently, he says the pain is in
 the epigastrium, going straight to the back pain.  It is better than it was.  No diarrhea or constip
ation.  No current nausea or vomiting.  No dysuria or hematuria.  No blood in his stool or urine.  No
 sore throat, runny nose, cough, headaches, or dizziness.  No fever or chills.



Review of Systems:

Otherwise unremarkable.



Medical History:  Denies medical history, but prior to admission obviously he has coronary artery dis
ease.



Past Surgical History:  Denies.



Allergies:  NONE.



Social History:  He is a smoker.  He was counseled.  Denies alcohol use.



Family History:  Noncontributory.



Physical Examination:

Vital signs:  Stable.  He is afebrile. 

General:  He is awake, alert, and oriented x3. 

Head and Neck:  Cranial nerves 2 through 12 grossly within normal limits.  No neck masses.  No JVD.  
Throat clear.  Neck is supple. 

Chest:  Clear. 

Heart:  S1, S2. 

Abdomen:  Soft.  He is tender in the epigastrium and right upper quadrant.  No rebound, rigidity, or 
guarding. 

Extremities:  Adequately perfused.  Nontender. 

Neuro:  Nonfocal.



Laboratory Data:  Shows a white count of 18.6.  INR is 0.97.  Blood gas reviewed.  Chemistry shows hi
s AST and ALT to be 449 and 90.  Troponin 1 was 1.05, that was on admission.  Currently his AST and A
LT are 405 and 371.  His total bilirubin is 1.4.  Direct bilirubin is 0.6.  Alkaline phosphatase is 8
3.  He had an ultrasound done, which showed gallstones wall that is 6 mm, cholelithiasis, slightly th
ickened gallbladder wall.



Assessment:  The patient with recent myocardial infarction, status post stent placement with some rafael
vated liver function tests and upper abdominal pain.



Recommendation:  The patient is high risk for any intervention surgically at this time, however, we w
ill get an MRCP to make sure he does not have a stone in his common bile duct and I will also put him
 on antibiotics.  We will allow to him clear liquids.  The patient needs to avoid surgery for the at 
least the 6 months regarding his gallstones if at all possible.  Plan of care discussed with Jessy IVY

DD:  07/19/2018 13:34:08Voice ID:  [id number]

DT:  07/19/2018 17:42:37Report ID:  [id number]

## 2018-07-19 NOTE — RAD REPORT
EXAM DESCRIPTION:  US - Abdomen Exam Limited - 7/18/2018 8:31 pm

 

CLINICAL HISTORY:  Abdominal pain.

 

COMPARISON:  None.

 

FINDINGS:   The gallbladder wall measures 6 millimeters. Gallstones are present.

 

The biliary tree is normal caliber.

 

IMPRESSION:  Cholelithiasis

 

Thickened gallbladder wall may indicate cholecystitis

## 2018-07-19 NOTE — P.PN
Subjective


Date of Service: 07/19/18


Primary Care Provider: None


Chief Complaint: STEMI


Subjective: No new changes, Tolerating diet, Ambulating, Improving





Patient is status post myocardial infarction with left anterior descending 

stent placement by cardiologist.  Labs in the images were reviewed today.  

There has been no elevation in liver enzymes over the last day or so.  

Subsequently abdominal ultrasound has been ordered and is shown to have 

cholelithiasis with possible gallbladder wall thickening.  Patient has been 

having mild epigastric pain radiating to the back.  Stated that his chest pain 

is feeling better.  Continues to have dry cough without adventitious breath 

sounds.  Patient otherwise seems to be improving.  Cardiology has seen him 

today and has been cleared from their standpoint.





<Omkar Sow - Last Filed: 07/19/18 12:18>


Date of Service: 07/19/18





<Kristine Keenan - Last Filed: 07/19/18 15:12>





Review of Systems


General: Unremarkable


Eyes: Unremarkable


ENT: Unremarkable


Respiratory: Cough


Cardiovascular: Unremarkable


Gastrointestinal: Abdominal Pain


Musculoskeletal: Unremarkable


Integumentary: Unremarkable


Neurological: Unremarkable





<Omkar Sow - Last Filed: 07/19/18 12:18>





Physical Examination





- Vital Signs


Temperature: 96.9 F


Blood Pressure: 116/81


Pulse: 82


Respirations: 18


Pulse Ox (%): 91





- Physical Exam


General: Alert, In no apparent distress, Oriented x3, Cooperative


HEENT: PERRLA, Mucous membr. moist/pink, EOMI


Neck: Supple, 2+ carotid pulse no bruit, JVD not distended


Respiratory: Clear to auscultation bilaterally, Normal air movement


Cardiovascular: No edema, Normal pulses, Regular rate/rhythm, Normal S1 S2, No 

gallops, No rubs, No murmurs


Capillary refill: <2 Seconds


Gastrointestinal: Normal bowel sounds, Soft and benign, Tenderness (2 

epigastric region and right upper quadrant without guarding or rebounding)


Musculoskeletal: No clubbing, No swelling, No contractures, No erythema, No 

tenderness, No warmth


Integumentary: No rashes, No breakdown, No significant lesion


Neurological: Normal speech, Normal strength at 5/5 x4 extr, Normal tone, 

Sensation intact, Cranial nerves 3-12 intact, Normal reflexes 2+, Normal affect





- Studies


Medications List Reviewed: Yes





<Omkar Sow - Last Filed: 07/19/18 12:18>





- Studies





 Laboratory Tests











  07/17/18 07/17/18 07/17/18





  08:25 08:25 08:25


 


WBC   16.2 H 


 


RBC   5.49 H 


 


Hgb   16.0 


 


Hct   46.8 


 


MCV   85.2 


 


MCH   29.1 


 


MCHC   34.2 


 


RDW   13.5 


 


Plt Count   237 


 


MPV   9.4 


 


Neutrophils %   78.3 H 


 


Lymphocytes %   14.4 L 


 


Monocytes %   6.9 


 


Eosinophils %   0.1 


 


Basophils %   0.3 


 


Absolute Neutrophils   12.7 H 


 


Absolute Lymphocytes   2.3 


 


Absolute Monocytes   1.1 


 


Absolute Eosinophils   0.0 


 


Absolute Basophils   0.0 


 


PT    11.4


 


INR    0.97


 


Sodium  135 L  


 


Potassium  4.1  


 


Chloride  99  


 


Carbon Dioxide  31  


 


BUN  10  


 


Creatinine  0.70  


 


Estimated GFR  > 90  


 


Glucose  323 H  


 


Calcium  9.5  


 


Magnesium  2.1  


 


Total Bilirubin  0.5  


 


Direct Bilirubin  0.1  


 


AST  449 H*  


 


ALT  90 H  


 


Alkaline Phosphatase  94  


 


Rapid Troponin I   


 


Serum Total Protein  8.1  


 


Albumin  3.9  


 


Globulin  4.2 H  


 


Albumin/Globulin Ratio  0.9 L  














  07/17/18





  08:25


 


WBC 


 


RBC 


 


Hgb 


 


Hct 


 


MCV 


 


MCH 


 


MCHC 


 


RDW 


 


Plt Count 


 


MPV 


 


Neutrophils % 


 


Lymphocytes % 


 


Monocytes % 


 


Eosinophils % 


 


Basophils % 


 


Absolute Neutrophils 


 


Absolute Lymphocytes 


 


Absolute Monocytes 


 


Absolute Eosinophils 


 


Absolute Basophils 


 


PT 


 


INR 


 


Sodium 


 


Potassium 


 


Chloride 


 


Carbon Dioxide 


 


BUN 


 


Creatinine 


 


Estimated GFR 


 


Glucose 


 


Calcium 


 


Magnesium 


 


Total Bilirubin 


 


Direct Bilirubin 


 


AST 


 


ALT 


 


Alkaline Phosphatase 


 


Rapid Troponin I  105.00 H*


 


Serum Total Protein 


 


Albumin 


 


Globulin 


 


Albumin/Globulin Ratio 














<Kristine Keenan - Last Filed: 07/19/18 15:12>





Assessment And Plan





- Current Problems (Diagnosis)


(1) Cholelithiasis


Current Visit: Yes   Status: Acute   


Qualifiers: 


   Cholelithiasis location: gallbladder   Cholecystitis presence: with 

cholecystitis 





(2) Elevated liver enzymes


Current Visit: Yes   Status: Acute   





- Plan





From a cardiac standpoint patient seems to be recovering very well.  Patient 

will need to be on continued antiplatelet therapy and anti lipidemic medicine.  

Pain medicine as needed for pericarditis secondary to myocardial infarction.  

New finding of cholelithiasis with possible cholecystitis has been noted.  

General Surgery has been consulted and is awaiting further evaluation from them 

for further treatment plan.  The patient has been placed on antibiotics for the 

mean time to ensure white cell count comes down.


Discharge Plan: Home


Plan to discharge in: 24 Hours





<Omkar Sow - Last Filed: 07/19/18 12:18>





- Plan





Case discussed with PA. Agree with above plan


Spoke w Dr. Lawrence, pt not a surgical candidate due to Acute MI however he will 

evaluate the patient. 





<Kristine Keenan - Last Filed: 07/19/18 15:12>

## 2018-07-19 NOTE — RAD REPORT
EXAM DESCRIPTION:  MRI - Cholangiogram - 7/19/2018 8:10 pm

 

CLINICAL HISTORY:  Abnormal liver functions studies, cholelithiasis

 

COMPARISON:  Gallbladder ultrasound July 18

 

FINDINGS:  At least 1 large gallstone is identifiable. Gallbladder is contracted. An approximately 17
 millimeter filling defect is seen within the lumen of the gallbladder near the neck. Gallbladder wal
l does not appear thickened or edematous and no fluid or stranding seen in the gallbladder fossa. Pat
ient does have a small amount of ascites along the right lateral liver capsule.

 

No intrahepatic or extrahepatic biliary tree dilatation. No stricture, mass or duct stone identifiabl
e. Pancreatic duct is relatively prominent in the head of the pancreas with the body and tail portion
 of the duct normal. No stricture or mass. This is likely normal variant. No evidence for a pancreati
c mass, fluid or stranding on the coronal and axial images.

 

IMPRESSION:  Cholelithiasis without evidence for significant acute gallbladder disease.

 

No duct stone or biliary tree abnormality.

## 2018-07-20 LAB
ALBUMIN SERPL BCP-MCNC: 2.7 G/DL (ref 3.4–5)
ALP SERPL-CCNC: 117 U/L (ref 45–117)
ALT SERPL W P-5'-P-CCNC: 331 U/L (ref 12–78)
AST SERPL W P-5'-P-CCNC: 221 U/L (ref 15–37)
BUN BLD-MCNC: 24 MG/DL (ref 7–18)
GLUCOSE SERPLBLD-MCNC: 183 MG/DL (ref 74–106)
HCT VFR BLD CALC: 37.4 % (ref 39.6–49)
LYMPHOCYTES # SPEC AUTO: 1.6 K/UL (ref 0.7–4.9)
MAGNESIUM SERPL-MCNC: 2.1 MG/DL (ref 1.8–2.4)
MCH RBC QN AUTO: 29.6 PG (ref 27–35)
MCV RBC: 84.5 FL (ref 80–100)
PMV BLD: 9.5 FL (ref 7.6–11.3)
POTASSIUM SERPL-SCNC: 4.7 MMOL/L (ref 3.5–5.1)
RBC # BLD: 4.43 M/UL (ref 4.33–5.43)

## 2018-07-20 RX ADMIN — POTASSIUM & SODIUM PHOSPHATES POWDER PACK 280-160-250 MG SCH: 280-160-250 PACK at 07:00

## 2018-07-20 RX ADMIN — CEFOXITIN SODIUM SCH MLS: 1 INJECTION, SOLUTION INTRAVENOUS at 05:37

## 2018-07-20 RX ADMIN — METRONIDAZOLE SCH MLS: 500 INJECTION, SOLUTION INTRAVENOUS at 00:50

## 2018-07-20 RX ADMIN — CEFOXITIN SODIUM SCH MLS: 1 INJECTION, SOLUTION INTRAVENOUS at 17:17

## 2018-07-20 RX ADMIN — ACETAMINOPHEN PRN MG: 500 TABLET, FILM COATED ORAL at 20:47

## 2018-07-20 RX ADMIN — POTASSIUM & SODIUM PHOSPHATES POWDER PACK 280-160-250 MG SCH PKT: 280-160-250 PACK at 11:53

## 2018-07-20 RX ADMIN — CIPROFLOXACIN SCH MLS: 2 INJECTION, SOLUTION INTRAVENOUS at 20:48

## 2018-07-20 RX ADMIN — SODIUM CHLORIDE SCH MLS: 0.9 INJECTION, SOLUTION INTRAVENOUS at 04:14

## 2018-07-20 RX ADMIN — CIPROFLOXACIN SCH MLS: 2 INJECTION, SOLUTION INTRAVENOUS at 09:28

## 2018-07-20 RX ADMIN — METOPROLOL SUCCINATE SCH MG: 50 TABLET, EXTENDED RELEASE ORAL at 09:28

## 2018-07-20 RX ADMIN — Medication SCH ML: at 20:50

## 2018-07-20 RX ADMIN — CEFOXITIN SODIUM SCH MLS: 1 INJECTION, SOLUTION INTRAVENOUS at 11:55

## 2018-07-20 RX ADMIN — POTASSIUM & SODIUM PHOSPHATES POWDER PACK 280-160-250 MG SCH PKT: 280-160-250 PACK at 09:28

## 2018-07-20 RX ADMIN — Medication SCH: at 09:00

## 2018-07-20 RX ADMIN — HUMAN INSULIN SCH UNIT: 100 INJECTION, SOLUTION SUBCUTANEOUS at 11:54

## 2018-07-20 RX ADMIN — METRONIDAZOLE SCH MLS: 500 INJECTION, SOLUTION INTRAVENOUS at 09:29

## 2018-07-20 RX ADMIN — MORPHINE SULFATE PRN MG: 4 INJECTION, SOLUTION INTRAMUSCULAR; INTRAVENOUS at 09:45

## 2018-07-20 RX ADMIN — PRASUGREL SCH MG: 10 TABLET, FILM COATED ORAL at 09:28

## 2018-07-20 RX ADMIN — HUMAN INSULIN SCH: 100 INJECTION, SOLUTION SUBCUTANEOUS at 16:26

## 2018-07-20 RX ADMIN — ATORVASTATIN CALCIUM SCH MG: 80 TABLET, FILM COATED ORAL at 20:49

## 2018-07-20 RX ADMIN — HUMAN INSULIN SCH UNIT: 100 INJECTION, SOLUTION SUBCUTANEOUS at 20:59

## 2018-07-20 RX ADMIN — HUMAN INSULIN SCH: 100 INJECTION, SOLUTION SUBCUTANEOUS at 07:30

## 2018-07-20 RX ADMIN — MORPHINE SULFATE PRN MG: 4 INJECTION, SOLUTION INTRAMUSCULAR; INTRAVENOUS at 14:51

## 2018-07-20 RX ADMIN — MORPHINE SULFATE PRN MG: 4 INJECTION, SOLUTION INTRAMUSCULAR; INTRAVENOUS at 22:03

## 2018-07-20 RX ADMIN — CEFOXITIN SODIUM SCH MLS: 1 INJECTION, SOLUTION INTRAVENOUS at 00:49

## 2018-07-20 RX ADMIN — METRONIDAZOLE SCH MLS: 500 INJECTION, SOLUTION INTRAVENOUS at 17:17

## 2018-07-20 RX ADMIN — ASPIRIN 81 MG SCH MG: 81 TABLET ORAL at 09:28

## 2018-07-20 RX ADMIN — SODIUM CHLORIDE SCH: 0.9 INJECTION, SOLUTION INTRAVENOUS at 16:20

## 2018-07-20 RX ADMIN — MORPHINE SULFATE PRN MG: 4 INJECTION, SOLUTION INTRAMUSCULAR; INTRAVENOUS at 03:05

## 2018-07-20 NOTE — PN
Date of Progress Note:  07/20/2018



Subjective:  The patient is awake, alert.  Does not really have abdominal pain.  He complains of ches
t tightness.  He does have pericarditis and recent stent placed.  He is tolerating his diet.



Objective:  Vital signs:  Stable, afebrile. 

Abdomen:  Benign.



Diagnostic Data:  MRCP reviewed.  No evidence of acute cholecystitis.  No common bile duct pathology.




Assessment:  Cholelithiasis, mild cholecystitis.



Recommendations:  Continue IV antibiotics.  Low-fat diet.  The patient is a high risk surgical interv
ention at this time.  He may have biliary colic.  After 6 months, he may benefit from a cholecystecto
my at that time.





AS/MODL

DD:  07/20/2018 10:34:55Voice ID:  [id number]

DT:  07/20/2018 11:58:36Report ID:  [id number]

## 2018-07-20 NOTE — RAD REPORT
EXAM DESCRIPTION:  CT - Chest For Pe Angio - 7/20/2018 1:20 pm

 

CLINICAL HISTORY:  Shortness of breath is

 

COMPARISON:  None.

 

TECHNIQUE:  Dynamically enhanced axial 3 mm thick images of the chest were obtained during administra
tion of <100> mL Isovue 370 IV contrast. Coronal and oblique reconstruction images were generated and
 reviewed. Exam utilizes a protocol for optimal evaluation of pulmonary arterial tree.

 

Maximum intensity projections 3D imaging was utilized

 

All CT scans are performed using dose optimization technique as appropriate and may include automated
 exposure control or mA/KV adjustment according to patient size.

 

FINDINGS:  A pulmonary embolus is not seen.

 

A thoracic aortic aneurysm is not noted.

 

Small bilateral pleural effusions are present A moderate pericardial effusion is  seen.

 

A lung consolidation is not present.

 

IMPRESSION:  Negative for a pulmonary embolism.

 

Moderate pericardial effusion

## 2018-07-21 LAB
ALBUMIN SERPL BCP-MCNC: 2.6 G/DL (ref 3.4–5)
ALP SERPL-CCNC: 148 U/L (ref 45–117)
ALT SERPL W P-5'-P-CCNC: 301 U/L (ref 12–78)
AST SERPL W P-5'-P-CCNC: 165 U/L (ref 15–37)
BUN BLD-MCNC: 15 MG/DL (ref 7–18)
GLUCOSE SERPLBLD-MCNC: 152 MG/DL (ref 74–106)
MAGNESIUM SERPL-MCNC: 2.3 MG/DL (ref 1.8–2.4)
POTASSIUM SERPL-SCNC: 4.1 MMOL/L (ref 3.5–5.1)

## 2018-07-21 RX ADMIN — HUMAN INSULIN SCH: 100 INJECTION, SOLUTION SUBCUTANEOUS at 21:00

## 2018-07-21 RX ADMIN — PRASUGREL SCH MG: 10 TABLET, FILM COATED ORAL at 09:31

## 2018-07-21 RX ADMIN — HUMAN INSULIN SCH UNIT: 100 INJECTION, SOLUTION SUBCUTANEOUS at 12:32

## 2018-07-21 RX ADMIN — METRONIDAZOLE SCH MLS: 500 INJECTION, SOLUTION INTRAVENOUS at 08:20

## 2018-07-21 RX ADMIN — HUMAN INSULIN SCH: 100 INJECTION, SOLUTION SUBCUTANEOUS at 07:30

## 2018-07-21 RX ADMIN — ACETAMINOPHEN PRN MG: 500 TABLET, FILM COATED ORAL at 08:18

## 2018-07-21 RX ADMIN — ATORVASTATIN CALCIUM SCH MG: 80 TABLET, FILM COATED ORAL at 20:06

## 2018-07-21 RX ADMIN — ASPIRIN 81 MG SCH MG: 81 TABLET ORAL at 08:19

## 2018-07-21 RX ADMIN — HUMAN INSULIN SCH: 100 INJECTION, SOLUTION SUBCUTANEOUS at 16:30

## 2018-07-21 RX ADMIN — CEFOXITIN SODIUM SCH MLS: 1 INJECTION, SOLUTION INTRAVENOUS at 00:46

## 2018-07-21 RX ADMIN — CEFOXITIN SODIUM SCH MLS: 1 INJECTION, SOLUTION INTRAVENOUS at 12:33

## 2018-07-21 RX ADMIN — METOPROLOL SUCCINATE SCH MG: 50 TABLET, EXTENDED RELEASE ORAL at 08:19

## 2018-07-21 RX ADMIN — METRONIDAZOLE SCH MLS: 500 INJECTION, SOLUTION INTRAVENOUS at 00:47

## 2018-07-21 RX ADMIN — CIPROFLOXACIN SCH MLS: 2 INJECTION, SOLUTION INTRAVENOUS at 08:18

## 2018-07-21 RX ADMIN — Medication SCH ML: at 08:20

## 2018-07-21 RX ADMIN — SODIUM CHLORIDE SCH MLS: 0.9 INJECTION, SOLUTION INTRAVENOUS at 00:59

## 2018-07-21 RX ADMIN — DIAZEPAM PRN MG: 5 TABLET ORAL at 00:54

## 2018-07-21 RX ADMIN — MORPHINE SULFATE PRN MG: 4 INJECTION, SOLUTION INTRAMUSCULAR; INTRAVENOUS at 03:41

## 2018-07-21 RX ADMIN — CEFOXITIN SODIUM SCH MLS: 1 INJECTION, SOLUTION INTRAVENOUS at 06:00

## 2018-07-21 RX ADMIN — SODIUM CHLORIDE SCH: 0.9 INJECTION, SOLUTION INTRAVENOUS at 05:40

## 2018-07-21 RX ADMIN — ACETAMINOPHEN PRN MG: 500 TABLET, FILM COATED ORAL at 20:10

## 2018-07-21 NOTE — PN
Date of Progress Note:  07/21/2018



Subjective:  Mr. Justice had been here since 07/17/2018, following a stent for an acute anterior MI.
  He had a completely occluded LAD.  Has good has CHF, anteroapical akinesis.  Has pericardial effusi
on without tamponade.  He is on colchicine, beta-blockers, aspirin Effient, and statin.  He remains t
achycardic with exertion and short of breath, but I think this is going to take some time before this
 improves.  We are hoping his ejection fraction will get better eventually.  For now, I would continu
e his present regimen.  He can go home as far as I am concerned.  I would add a low-dose ACE inhibito
r.  Continue his other regimen.  I would like to see him in the next week or 2 in the office.





NB/AYANNA

DD:  07/21/2018 08:55:54Voice ID:  [id number]

DT:  07/21/2018 12:17:44Report ID:  [id number]

## 2018-07-21 NOTE — P.PN
Subjective


Date of Service: 07/21/18


Primary Care Provider: None


Chief Complaint: STEMI


Patient seen and examined at bedside with RN.  Chart reviewed.  Case discussed 

with cardiology at this time.  Case also discussed with general surgery at this 

time.  Currently patient has no complaints to offer states that he has been 

ambulating and eating his diet well and no chest pain.  Her no shortness of 

breath noted at this time.








Review of Systems


General: As per HPI





Physical Examination





- Vital Signs


Temperature: 97.6 F


Blood Pressure: 112/72


Pulse: 84


Respirations: 18


Pulse Ox (%): 94





- Physical Exam


General: Alert, In no apparent distress


HEENT: Atraumatic, PERRLA, EOMI


Neck: Supple, JVD not distended


Respiratory: Clear to auscultation bilaterally, Normal air movement


Cardiovascular: Regular rate/rhythm, Normal S1 S2


Gastrointestinal: Normal bowel sounds, No tenderness


Musculoskeletal: No tenderness


Integumentary: No rashes


Neurological: Normal speech, Normal tone, Normal affect


Lymphatics: No axilla or inguinal lymphadenopathy





- Studies


Medications List Reviewed: Yes





Assessment & Plan





- Problems (Diagnosis)


(1) STEMI (ST elevation myocardial infarction)


Onset Date: 07/18/18   Current Visit: Yes   Status: Acute   


Plan: 


Pt with ST elevation in Lateral Leads and troponin elevated to 105. Anterior 

Wall MI. 


-Cardiology consulted. Appreciate Reccs 


-S/p heart Cath with Stent Placement in the LAD 


   -Occlusion of the LAD


-BB, ASA, Effient, Statin and low dose lisinopril


-Nitroglcerin for Angina PRN





Qualifiers: 


   Involved coronary artery: LAD coronary artery   Qualified Code(s): I21.02 - 

ST elevation (STEMI) myocardial infarction involving left anterior descending 

coronary artery   





(2) Pericarditis as complication of acute myocardial infarction


Current Visit: Yes   Status: Acute   


Plan: 


Pleuretic Chest pain, Possible Pericarditis 


-ECHO wiht EF of 31% and Hypokensus. 


-Mod Pericardial effusion noted.  


-Started on Colchicine











(3) Cardiomyopathy


Current Visit: Yes   Status: Acute   


Qualifiers: 


   Cardiomyopathy type: ischemic   Qualified Code(s): I25.5 - Ischemic 

cardiomyopathy   





(4) Moderate left ventricular systolic dysfunction


Current Visit: Yes   Status: Acute   





(5) Cholelithiasis


Current Visit: Yes   Status: Acute   


Plan: 


CT abd and liver US with Cholelithiasis 


-Elevated LFT's now improving 


-Surgery Consulted. Appreciate Reccs


   -poor Candidate for surgery given his recent MI and poor EF


-Outpt F/u 


-Oral Abx. 


-Will f.u with LFT's lisa. 


Qualifiers: 


   Cholelithiasis location: gallbladder   Cholecystitis presence: with 

cholecystitis   Cholecystitis acuity: unspecified acuity   Biliary obstruction: 

without biliary obstruction   Qualified Code(s): K80.10 - Calculus of 

gallbladder with chronic cholecystitis without obstruction   


Discharge Plan: Home


Plan to discharge in: 48 Hours





- Code Status/Comfort Care


Code Status Assessed: Yes


Critical Care: No

## 2018-07-21 NOTE — P.PN
Subjective


Date of Service: 07/20/18





Regain patient ready for discharge. However patient became severely short of 

breath.  Patient became tachycardic with heart rate in the 160s.  Family was 

concerned about patient's clinical status.  Patient had a large myocardial 

infarction which left his anterior segment of his left ventricle akinetic.  His 

ejection fraction is only 31%.  Uncertain if he will regain any improvement 

after stand as he may have had completion of his myocardial infarction as he 

waited 48 hr prior to coming into the hospital.  Will do a stat CT of the chest 

& discuss the case with Cardiology.





Review of Systems


10-point ROS is otherwise unremarkable





Physical Examination





- Vital Signs


Temperature: 97.7 F


Blood Pressure: 112/70


Pulse: 88


Respirations: 20


Pulse Ox (%): 93





- Physical Exam


General: Alert, In no apparent distress, Oriented x3


Neck: JVD distended


Respiratory: Diminished, Crackles/rales


Cardiovascular: Regular rate/rhythm, Normal S1 S2, Systolic murmur


Gastrointestinal: Normal bowel sounds, Soft and benign, Non-distended, No 

tenderness


Musculoskeletal: No clubbing, No swelling, No tenderness


Integumentary: No rashes


Neurological: Normal speech, Normal tone, Sensation intact, Cranial nerves 3-12 

intact, Normal affect


Lymphatics: No axilla or inguinal lymphadenopathy





- Studies


Medications List Reviewed: Yes





Assessment & Plan





- Problems (Diagnosis)


(1) Anterior wall myocardial infarction


Current Visit: Yes   Status: Acute   





(2) Cardiomyopathy


Current Visit: Yes   Status: Acute   





(3) Moderate left ventricular systolic dysfunction


Current Visit: Yes   Status: Acute   





(4) Acute pericardial effusion


Current Visit: Yes   Status: Acute   





(5) Pericarditis as complication of acute myocardial infarction


Current Visit: Yes   Status: Acute   





(6) STEMI (ST elevation myocardial infarction)


Onset Date: 07/18/18   Current Visit: Yes   Status: Acute   


Qualifiers: 


   Involved coronary artery: LAD coronary artery   Qualified Code(s): I21.02 - 

ST elevation (STEMI) myocardial infarction involving left anterior descending 

coronary artery   





- Plan





Plan:


1. Continue with colchicine


2. No steroids as patient with recent myocardial infarction and increase risk 

of myocardial rupture with steroid use


3. Continue cardiac medications


4. Echocardiogram


5. Outpatient cardiac rehab


6. PT evaluation


7. Continue with anti-platelet therapy and statin therapy


8. Beta-blocker therapy


9. Monitor fluid status as patient may need diuretics if his ejection fraction 

is not improving


10. Close outpatient follow with Cardiology


11. Surgery for gallbladder on hold until cardiac status improves





- Advance Directives


Does patient have a Living Will: No


Does patient have a Durable POA for Healthcare: No





- Code Status/Comfort Care


Code Status Assessed: Yes


Code Status: Full Code


Critical Care: No


Time Spent Managing PTS Care (In Minutes): 50

## 2018-07-21 NOTE — PN
History:  The patient had been admitted by Dr. Singh with an acute MI on 07/17/2018.  I took him to t
 cath lab from the emergency room and did a stent on a completely occluded LAD.  Since then, he con
tinued to have chest pain and was found to have pericardial effusion with pericarditis.  Has been giv
en colchicine.  He is on a low-dose beta-blocker, aspirin, Effient, and a statin.  Continues to have 
some chest pain.  We will continue his present treatment as try to ambulate him.  It would be great i
f we could put him on a small dose of ACE inhibitor because of his anteroapical hypokinesis.  He had 
come approximately 48 hours after his MI started.  I will discuss the case further with Dr. Singh.





NB/AYANNA

DD:  07/20/2018 18:18:59Voice ID:  [id number]

DT:  07/20/2018 23:12:10Report ID:  [id number]

## 2018-07-21 NOTE — PN
Date of Progress Note:  07/20/2018



Subjective:  Mr. Justice came in on 07/17/2018, with an acute anterior MI, occluded LAD.  He has dev
eloped CHF, since has anteroapical akinesis.  Ejection fraction about 30%.  Small pericardial effusio
n.  We are going to send him home actually today, but he felt short of breath and tachycardic when he
 did any minimal exertion.  With it, we decided to repeat an echocardiogram.  He has had one on 07/18
/2018, showing a small pericardial effusion.  A CTA was negative for pulmonary embolus, but showed mo
derate pericardial effusion.  A stat echo was done revealing no tamponade.  He did have a small peric
ardial effusion.  Ejection fraction was reduced at about 30% to 35% with anteroapical akinesis.



Plan:  I recommend we increase his beta blocker.  Continue aspirin.  Continue Effient.  Continue Lipi
tor.  He is not hypertensive, but he could certainly use low-dose ACE inhibitors as well.





NB/AYANNA

DD:  07/21/2018 08:54:01Voice ID:  [id number]

DT:  07/21/2018 12:14:43Report ID:  [id number]

## 2018-07-22 VITALS — TEMPERATURE: 98 F | DIASTOLIC BLOOD PRESSURE: 71 MMHG | SYSTOLIC BLOOD PRESSURE: 101 MMHG

## 2018-07-22 VITALS — OXYGEN SATURATION: 93 %

## 2018-07-22 LAB
ALBUMIN SERPL BCP-MCNC: 2.5 G/DL (ref 3.4–5)
ALP SERPL-CCNC: 129 U/L (ref 45–117)
ALT SERPL W P-5'-P-CCNC: 214 U/L (ref 12–78)
AST SERPL W P-5'-P-CCNC: 88 U/L (ref 15–37)
BUN BLD-MCNC: 12 MG/DL (ref 7–18)
GLUCOSE SERPLBLD-MCNC: 171 MG/DL (ref 74–106)
HCT VFR BLD CALC: 35.5 % (ref 39.6–49)
LYMPHOCYTES # SPEC AUTO: 1.6 K/UL (ref 0.7–4.9)
MCH RBC QN AUTO: 29.9 PG (ref 27–35)
MCV RBC: 85 FL (ref 80–100)
PMV BLD: 8.9 FL (ref 7.6–11.3)
POTASSIUM SERPL-SCNC: 3.9 MMOL/L (ref 3.5–5.1)
RBC # BLD: 4.18 M/UL (ref 4.33–5.43)

## 2018-07-22 RX ADMIN — ASPIRIN 81 MG SCH MG: 81 TABLET ORAL at 08:25

## 2018-07-22 RX ADMIN — HUMAN INSULIN SCH UNIT: 100 INJECTION, SOLUTION SUBCUTANEOUS at 11:55

## 2018-07-22 RX ADMIN — PRASUGREL SCH MG: 10 TABLET, FILM COATED ORAL at 08:25

## 2018-07-22 RX ADMIN — ACETAMINOPHEN PRN MG: 500 TABLET, FILM COATED ORAL at 06:40

## 2018-07-22 RX ADMIN — METOPROLOL SUCCINATE SCH MG: 50 TABLET, EXTENDED RELEASE ORAL at 08:26

## 2018-07-22 RX ADMIN — HUMAN INSULIN SCH: 100 INJECTION, SOLUTION SUBCUTANEOUS at 07:30

## 2018-07-22 NOTE — P.DS
Admission Date: 07/17/18


Discharge Date: 07/22/18


Primary Care Provider: None


Disposition: ROUTINE DISCHARGE


Discharge Condition: GOOD


Reason for Admission: STEMI


Consultations: 





Cardiology


General surgery





- Problems


(1) STEMI (ST elevation myocardial infarction)


Onset Date: 07/18/18   Current Visit: Yes   Status: Acute   


Qualifiers: 


   Involved coronary artery: LAD coronary artery   Qualified Code(s): I21.02 - 

ST elevation (STEMI) myocardial infarction involving left anterior descending 

coronary artery   





(2) Pericarditis as complication of acute myocardial infarction


Current Visit: Yes   Status: Acute   





(3) Cardiomyopathy


Current Visit: Yes   Status: Acute   


Qualifiers: 


   Cardiomyopathy type: ischemic   Qualified Code(s): I25.5 - Ischemic 

cardiomyopathy   





(4) Moderate left ventricular systolic dysfunction


Current Visit: Yes   Status: Acute   





(5) Cholelithiasis


Current Visit: Yes   Status: Acute   


Qualifiers: 


   Cholelithiasis location: gallbladder   Cholecystitis presence: with 

cholecystitis   Cholecystitis acuity: unspecified acuity   Biliary obstruction: 

without biliary obstruction   Qualified Code(s): K80.10 - Calculus of 

gallbladder with chronic cholecystitis without obstruction   


Brief History of Present Illness: 





This is a 58-year-old male with no significant past medical history who 

presented to the ED complaining of having some chest pain.  Patient stated that 

his chest pain started yesterday.  Chest pain was in the left lateral side 

which is radiating back to his back and up his neck as well.  Patient stated 

that he was resting at that time the chest pain started at its worse patient 

was 8/10 and felt like heaviness his chest area.  Patient also had associated 

nausea and feeling of on using his with the chest pain.  Patient has never had 

any other past medical history number has anything like this happen to him 

before.  Patient denies having any alcohol positive for tobacco.





In the ER patient was found to have STEMI on the EKG and troponin was elevated 

to 105 and thus was admitted for further care. 


Hospital Course: 





Overall during the hospital stay patient remained stable





Patient was initially admitted to the hospital for anterior wall MI.  

Cardiology was consulted from the ER.  Patient was taken to the cath lab.  

Patient had a stent placement in the LAD.  Patient did well overall post 

procedure.  Initially patient was complaining of having chest pain after the 

procedure.  Patient started on nitroglycerin p.r.n..  Patient's chest pain did 

resolve after that.  Patient had and repeat echocardiogram done here in the 

hospital which was consistent with impaired left ventricular ejection fraction 

along with global hypokinesis.  Initially patient was started on a beta-blocker

, aspirin, statin, ACE inhibitor was added due to the low ejection fraction 

post MI.  Patient on day 3 of hospitalization complained of having some 

pleuritic chest pain this an echocardiogram was repeated which was consistent 

with pericardial effusion and small amount.  Patient was thought to have 

pericarditis and thus was started on colchicine.  Patient's chest pain then 

resolved completely.  Patient then was transferred to the floor.  Patient was 

working with physical therapy and doing well overall.  Patient was noted to 

have elevated LFTs on the day for here in the hospital is patient.  The patient 

had a ultrasound of the gallbladder done which was consistent with 

cholelithiasis.  General surgery was consulted who recommended MRCP.  MRCP was 

negative for any acute cholecystitis.  Patient however would not be a candidate 

for any acute surgery at this time for his cholelithiasis that general surgery 

stated the patient follow up outpatient in 6 months.  And other lab work done 

to evaluate for his elevated LFTs and was found at 80. Patient at that time was 

educated extensively on the disease process and was asked to follow up with the 

GI doctor once discharged her from the hospital.  Patient afebrile was doing 

well overall ambulated around not having any shortness of breath or chest pain 

and lab work was improving and thus was discharged home under stable condition.

  Patient was asked to follow up with primary care provider, cardiology and GI 

doctor to follow up with his acute pincher wall MI, congestive heart failure 

with low ejection fraction, pericarditis, hepatitis-C, cardiomyopathy, patient 

will be taking medications for CAD at home. 


Vital Signs/Physical Exam: 














Temp Pulse Resp BP Pulse Ox


 


 98.0 F   86   18   101/71   97 


 


 07/22/18 12:00  07/22/18 12:00  07/22/18 12:00  07/22/18 12:00 07/22/18 12:00








General: Alert, In no apparent distress


HEENT: Atraumatic, PERRLA, EOMI


Neck: Supple, JVD not distended


Respiratory: Clear to auscultation bilaterally, Normal air movement


Cardiovascular: Regular rate/rhythm, Normal S1 S2


Gastrointestinal: Normal bowel sounds, No tenderness


Musculoskeletal: No tenderness


Integumentary: No rashes


Neurological: Normal speech, Normal tone, Normal affect


Lymphatics: No axilla or inguinal lymphadenopathy


Laboratory Data at Discharge: 














WBC  9.3 K/uL (4.3-10.9)  D 07/22/18  04:43    


 


Hgb  12.5 g/dL (13.6-17.9)  L  07/22/18  04:43    


 


Hct  35.5 % (39.6-49.0)  L  07/22/18  04:43    


 


Plt Count  257 K/uL (152-406)   07/22/18  04:43    


 


PT  11.4 SECONDS (9.5-12.5)   07/17/18  08:25    


 


INR  0.97   07/17/18  08:25    


 


Sodium  140 mmol/L (136-145)   07/22/18  04:43    


 


Potassium  3.9 mmol/L (3.5-5.1)   07/22/18  04:43    


 


BUN  12 mg/dL (7-18)   07/22/18  04:43    


 


Creatinine  0.70 mg/dL (0.55-1.3)   07/22/18  04:43    


 


Glucose  171 mg/dL ()  H  07/22/18  04:43    


 


Phosphorus  2.7 mg/dL (2.5-4.9)   07/21/18  05:14    


 


Magnesium  2.3 mg/dL (1.8-2.4)   07/21/18  05:14    


 


Total Bilirubin  0.6 mg/dL (0.2-1.0)   07/22/18  04:43    


 


AST  88 U/L (15-37)  H  07/22/18  04:43    


 


ALT  214 U/L (12-78)  H  07/22/18  04:43    


 


Alkaline Phosphatase  129 U/L ()  H  07/22/18  04:43    


 


Triglycerides  95 mg/dL (<150)   07/18/18  04:55    


 


Cholesterol  153 mg/dL (<200)   07/18/18  04:55    


 


HDL Cholesterol  55 mg/dL (40-60)   07/18/18  04:55    


 


Cholesterol/HDL Ratio  2.78   07/18/18  04:55    








Home Medications: 








Aspirin 81 mg PO DAILY #30 tab.chew 07/19/18 


Atorvastatin Calcium [Lipitor] 80 mg PO BEDTIME #30 tab 07/19/18 


Colchicine 0.6 mg PO BID 10 Days #20 tablet 07/19/18 


Prasugrel Hydrochloride [Effient] 10 mg PO DAILY #30 tab 07/19/18 


Cefdinir [Omnicef] 300 mg PO BID #14 capsule 07/21/18 


Metoprolol Tartrate [Lopressor*] 50 mg PO BID #60 tab 07/21/18 


metroNIDAZOLE [Flagyl] 500 mg PO Q8H #21 tablet 07/21/18 





New Medications: 


Aspirin 81 mg PO DAILY #30 tab.chew


Atorvastatin Calcium [Lipitor] 80 mg PO BEDTIME #30 tab


Cefdinir [Omnicef] 300 mg PO BID #14 capsule


Colchicine 0.6 mg PO BID 10 Days #20 tablet


Metoprolol Tartrate [Lopressor*] 50 mg PO BID #60 tab


metroNIDAZOLE [Flagyl] 500 mg PO Q8H #21 tablet


Prasugrel Hydrochloride [Effient] 10 mg PO DAILY #30 tab


Patient Discharge Instructions: OK TO DC IV AND DC HOME IF DOES WELL WITH PT.  

FOLLOW-UP WITH PRIMARY CARE PROVIDER IN 1-2 WEEKS.  FOLLOW-UP WITH CARDIOLOGY 

IN 1 WEEK.  FOLLOW-UP WITH SURGERY IN 2-4 WEEKS.  RETURN TO THE ER IF CHEST 

PAIN OR RESPIRATORY STATUS WORSENS.  CALL or TEXT DR. DE LA O [phone] IF 

ANY QUESTIONS REGARDING HOSPITAL STAY.  PLEASE CALL THE FLOOR -[phone] 

IF ANY MEDICATION OR NURSING QUESTIONS.


Diet: AHA


Activity: Fall precautions


Followup: 


Dami Sandoval MD [ACTIVE - CAN ADMIT] -  (follow up next week, call to 

schedule appointment)


Paul Lawrence MD [ACTIVE - CAN ADMIT] - 1 Week (Call to schedule an appointment)

## 2018-07-23 LAB — HCV RNA SPEC NAA+PROBE-LOG#: 7.05 LOG IU/ML

## 2018-07-23 NOTE — ECHO
HEIGHT: 5 ft 4 in   WEIGHT: 209 lb 4 oz   DATE OF STUDY: 07/20/2018   REFER DR: Xavier Valle MD

2-DIMENSIONAL: YES

     M.MODE: YES

 DOPPLER: YES

COLOR FLOW: YES



                    TDS:  NO

PORTABLE: NO

 DEFINITY:  NO

BUBBLE STUDY: NO





DIAGNOSIS:  PERICARDIAL EFFUSION



CARDIAC HISTORY:  

CATHERIZATION: NO

SURGERY: NO

PROSTHETIC VALVE: NO

PACEMAKER: NO





MEASUREMENTS (cm)

    DIASTOLIC (NORMALS)                 SYSTOLIC (NORMALS)

IVSd                 1.1 (0.6-1.2)                    LA Diam 3.6 (1.9-4.0)     LVEF       
  35%  

LVIDd               4.7 (3.5-5.7)                        LVIDs      4.1 (2.0-3.5)     %FS  
        13%

LVPWd             1.1 (0.6-1.2)

Ao Diam           3.2 (2.0-3.7)



2 DIMENSIONAL ASSESSMENT:

RIGHT ATRIUM:                   NORMAL

LEFT ATRIUM:       NORMAL



RIGHT VENTRICLE:            NORMAL

LEFT VENTRICLE: NORMAL SIZE



TRICUSPID VALVE:             NORMAL

MITRAL VALVE:     NORMAL



PULMONIC VALVE:             NORMAL

AORTIC VALVE:     NORMAL



PERICARDIAL EFFUSION: SMALL

AORTIC ROOT:      LAURENT





LEFT VENTRICULAR WALL MOTION:     ANTEROAPICAL AKINESISS.



DOPPLER/COLOR FLOW:     MILD TRICUPSID REGURGITATION.



COMMENTS:      SMALL PERICARDIAL EFFUSION. NO TAMPONADE. ANTEROAPICAL AKINESISS. LEFT 
VENTRICULAR EJECTION FRACTION 35%. MILD TRICUPSID REGURGITATION.



TECHNOLOGIST:   PANKAJ HARLEY

## 2018-08-12 ENCOUNTER — HOSPITAL ENCOUNTER (OUTPATIENT)
Dept: HOSPITAL 97 - ER | Age: 58
Setting detail: OBSERVATION
LOS: 2 days | Discharge: HOME | End: 2018-08-14
Attending: FAMILY MEDICINE | Admitting: FAMILY MEDICINE
Payer: COMMERCIAL

## 2018-08-12 VITALS — BODY MASS INDEX: 34 KG/M2

## 2018-08-12 DIAGNOSIS — I08.1: ICD-10-CM

## 2018-08-12 DIAGNOSIS — R07.81: ICD-10-CM

## 2018-08-12 DIAGNOSIS — E11.69: ICD-10-CM

## 2018-08-12 DIAGNOSIS — B18.2: ICD-10-CM

## 2018-08-12 DIAGNOSIS — I30.8: ICD-10-CM

## 2018-08-12 DIAGNOSIS — Z87.891: ICD-10-CM

## 2018-08-12 DIAGNOSIS — Z09: ICD-10-CM

## 2018-08-12 DIAGNOSIS — E78.5: ICD-10-CM

## 2018-08-12 DIAGNOSIS — J44.1: ICD-10-CM

## 2018-08-12 DIAGNOSIS — I11.0: Primary | ICD-10-CM

## 2018-08-12 DIAGNOSIS — I25.118: ICD-10-CM

## 2018-08-12 DIAGNOSIS — I25.2: ICD-10-CM

## 2018-08-12 DIAGNOSIS — Z79.82: ICD-10-CM

## 2018-08-12 DIAGNOSIS — Z95.5: ICD-10-CM

## 2018-08-12 DIAGNOSIS — I50.23: ICD-10-CM

## 2018-08-12 DIAGNOSIS — Z79.02: ICD-10-CM

## 2018-08-12 LAB
ALBUMIN SERPL BCP-MCNC: 3.1 G/DL (ref 3.4–5)
ALP SERPL-CCNC: 69 U/L (ref 45–117)
ALT SERPL W P-5'-P-CCNC: 50 U/L (ref 12–78)
AST SERPL W P-5'-P-CCNC: 23 U/L (ref 15–37)
BUN BLD-MCNC: 16 MG/DL (ref 7–18)
CKMB CREATINE KINASE MB: 1 NG/ML (ref 0.3–3.6)
CKMB CREATINE KINASE MB: 1.1 NG/ML (ref 0.3–3.6)
GLUCOSE SERPLBLD-MCNC: 245 MG/DL (ref 74–106)
HCT VFR BLD CALC: 41.3 % (ref 39.6–49)
INR BLD: 0.97
LYMPHOCYTES # SPEC AUTO: 3.5 K/UL (ref 0.7–4.9)
MAGNESIUM SERPL-MCNC: 1.6 MG/DL (ref 1.8–2.4)
MCH RBC QN AUTO: 28.4 PG (ref 27–35)
MCV RBC: 83.7 FL (ref 80–100)
NT-PROBNP SERPL-MCNC: 4257 PG/ML (ref ?–125)
PMV BLD: 8.8 FL (ref 7.6–11.3)
POTASSIUM SERPL-SCNC: 4.2 MMOL/L (ref 3.5–5.1)
RBC # BLD: 4.93 M/UL (ref 4.33–5.43)
UA DIPSTICK W REFLEX MICRO PNL UR: (no result)

## 2018-08-12 PROCEDURE — 36415 COLL VENOUS BLD VENIPUNCTURE: CPT

## 2018-08-12 PROCEDURE — 94640 AIRWAY INHALATION TREATMENT: CPT

## 2018-08-12 PROCEDURE — 80076 HEPATIC FUNCTION PANEL: CPT

## 2018-08-12 PROCEDURE — 71275 CT ANGIOGRAPHY CHEST: CPT

## 2018-08-12 PROCEDURE — 84484 ASSAY OF TROPONIN QUANT: CPT

## 2018-08-12 PROCEDURE — 83735 ASSAY OF MAGNESIUM: CPT

## 2018-08-12 PROCEDURE — 93005 ELECTROCARDIOGRAM TRACING: CPT

## 2018-08-12 PROCEDURE — 82550 ASSAY OF CK (CPK): CPT

## 2018-08-12 PROCEDURE — 93306 TTE W/DOPPLER COMPLETE: CPT

## 2018-08-12 PROCEDURE — 84145 PROCALCITONIN (PCT): CPT

## 2018-08-12 PROCEDURE — 71046 X-RAY EXAM CHEST 2 VIEWS: CPT

## 2018-08-12 PROCEDURE — 71045 X-RAY EXAM CHEST 1 VIEW: CPT

## 2018-08-12 PROCEDURE — 85025 COMPLETE CBC W/AUTO DIFF WBC: CPT

## 2018-08-12 PROCEDURE — 80048 BASIC METABOLIC PNL TOTAL CA: CPT

## 2018-08-12 PROCEDURE — 83880 ASSAY OF NATRIURETIC PEPTIDE: CPT

## 2018-08-12 PROCEDURE — 96375 TX/PRO/DX INJ NEW DRUG ADDON: CPT

## 2018-08-12 PROCEDURE — 82962 GLUCOSE BLOOD TEST: CPT

## 2018-08-12 PROCEDURE — 99285 EMERGENCY DEPT VISIT HI MDM: CPT

## 2018-08-12 PROCEDURE — 82553 CREATINE MB FRACTION: CPT

## 2018-08-12 PROCEDURE — 85610 PROTHROMBIN TIME: CPT

## 2018-08-12 PROCEDURE — 86140 C-REACTIVE PROTEIN: CPT

## 2018-08-12 PROCEDURE — 85730 THROMBOPLASTIN TIME PARTIAL: CPT

## 2018-08-12 PROCEDURE — 96365 THER/PROPH/DIAG IV INF INIT: CPT

## 2018-08-12 PROCEDURE — 96361 HYDRATE IV INFUSION ADD-ON: CPT

## 2018-08-12 PROCEDURE — 85652 RBC SED RATE AUTOMATED: CPT

## 2018-08-12 PROCEDURE — 81003 URINALYSIS AUTO W/O SCOPE: CPT

## 2018-08-12 PROCEDURE — 87040 BLOOD CULTURE FOR BACTERIA: CPT

## 2018-08-12 RX ADMIN — HUMAN INSULIN SCH: 100 INJECTION, SOLUTION SUBCUTANEOUS at 15:57

## 2018-08-12 RX ADMIN — FUROSEMIDE SCH MG: 10 INJECTION, SOLUTION INTRAVENOUS at 17:49

## 2018-08-12 RX ADMIN — Medication SCH ML: at 21:29

## 2018-08-12 RX ADMIN — HUMAN INSULIN SCH UNIT: 100 INJECTION, SOLUTION SUBCUTANEOUS at 21:24

## 2018-08-12 RX ADMIN — ATORVASTATIN CALCIUM SCH MG: 80 TABLET, FILM COATED ORAL at 21:24

## 2018-08-12 RX ADMIN — ENOXAPARIN SODIUM SCH MG: 40 INJECTION SUBCUTANEOUS at 17:40

## 2018-08-12 RX ADMIN — ARFORMOTEROL TARTRATE SCH MCG: 15 SOLUTION RESPIRATORY (INHALATION) at 19:19

## 2018-08-12 RX ADMIN — METOPROLOL TARTRATE SCH MG: 25 TABLET ORAL at 18:47

## 2018-08-12 RX ADMIN — HYDROCODONE BITARTRATE AND ACETAMINOPHEN PRN TAB: 7.5; 325 TABLET ORAL at 18:51

## 2018-08-12 NOTE — RAD REPORT
EXAM DESCRIPTION:  CT - Chest For Pe Angio - 8/12/2018 12:01 pm

 

CLINICAL HISTORY:

 Chest pain, cough, shortness of breath

 

COMPARISON:  Chest pain same date, PE study July 20

 

TECHNIQUE:  Dynamically enhanced 3 mm thick images of the chest were obtained during administration o
f approximately 150mL Isovue 370 IV contrast. Coronal and oblique reconstruction images were generate
d and reviewed. Exam utilizes a protocol to evaluate the pulmonary arterial tree.

 

All CT scans are performed using dose optimization technique as appropriate and may include automated
 exposure control or mA/KV adjustment according to patient size.

 

FINDINGS:  No pulmonary emboli are identified.

 

The aorta as imaged shows no acute or suspicious finding. Prominent pericardial effusion present dewayne
lar to July.

 

No focal mass or infiltrate. Interstitial markings are prominent and could be edema or infiltrate. Bi
lateral pleural effusions are present, left greater than right, with both decreased in volume from Ju
ly 20.

 

No mediastinal or hilar suspicious masses. No chest wall masses or abnormal axillary lymphadenopathy.


 

IMPRESSION:  No pulmonary emboli identified.

 

Prominent pericardial effusion similar to the July 20 study.

 

Left greater than right pleural effusions small in size and decreased from July 20.

 

Overall prominence of the interstitial markings from edema or infiltrate.

## 2018-08-12 NOTE — ER
Nurse's Notes                                                                                     

 [facility]                                                                

Name: Van Justice                                                                               

Age: 58 yrs                                                                                       

Sex: Male                                                                                         

: 1960                                                                                   

MRN: [id number]                                                                                   

Arrival Date: 2018                                                                          

Time: 10:08                                                                                       

Account#: [id number]                                                                            

Bed 13                                                                                            

Private MD: Demetrio Russ E                                                                     

Diagnosis: Chest pain, unspecified;Shortness of breath                                            

                                                                                                  

Presentation:                                                                                     

                                                                                             

10:15 Initial Sepsis Screen: Does the patient meet any 2 criteria? No. Patient's initial      rb1 

      sepsis screen is negative. Does the patient have a suspected source of infection? No.       

      Patient's initial sepsis screen is negative.                                                

10:17 Presenting complaint: Patient states: Chest tightness, nonproductive cough, pain with   hb  

      cough, and SOB x 1 week, worse over last 3 days. Denies fever. Pain is worse when           

      supine. Transition of care: patient was not received from another setting of care.          

      Onset of symptoms was 2018. Risk Assessment: Do you want to hurt yourself or     

      someone else? Patient reports no desire to harm self or others. Care prior to arrival:      

      None.                                                                                       

10:17 Method Of Arrival: Ambulatory                                                           hb  

10:17 Acuity: MEAGHAN 3                                                                           hb  

                                                                                                  

Historical:                                                                                       

- Allergies:                                                                                      

10:20 NKA;                                                                                    hb  

- Home Meds:                                                                                      

10:20 atorvastatin oral oral [Active]; Metronidazole Oral [Active]; metoprolol succinate oral hb  

      oral [Active]; Colchicine Oral [Active]; aspirin Oral [Active];                             

- PMHx:                                                                                           

10:20 Diabetes - NIDDM; Hypertension;                                                         hb  

- PSHx:                                                                                           

10:20 Heart stents;                                                                           hb  

                                                                                                  

- Immunization history:: Adult Immunizations up to date.                                          

- Social history:: Smoking status: Patient/guardian denies using tobacco, the patient             

  reports quitting approximately .25 years ago.                                                   

- Ebola Screening: : No symptoms or risks identified at this time.                                

                                                                                                  

                                                                                                  

Screening:                                                                                        

10:20 Abuse screen: Denies threats or abuse. Denies injuries from another. Nutritional        hb  

      screening: No deficits noted. Tuberculosis screening: No symptoms or risk factors           

      identified. Fall Risk None identified.                                                      

                                                                                                  

Assessment:                                                                                       

10:15 General: Appears uncomfortable, Behavior is calm, cooperative. Pain: Complains of pain  rb1 

      in anterior aspect of right upper chest and anterior aspect of left upper chest Pain        

      radiates to back between the shoulders Pain currently is 4 out of 10 on a pain scale.       

      Pain began x 3 days. Neuro: Level of Consciousness is awake, alert, obeys commands,         

      Oriented to person, place, time, situation. Cardiovascular: Capillary refill < 3            

      seconds is brisk in bilateral fingers. Respiratory: Airway is patent Respiratory effort     

      is even, unlabored, Respiratory pattern is regular, symmetrical. GI: Reports nausea.        

      : No signs and/or symptoms were reported regarding the genitourinary system. Derm:        

      Skin is pink, warm \T\ dry.                                                                 

11:00 Reassessment: Nitroglycerin order was held due to low BP 98/74; provider notified.      rb1 

12:00 Reassessment: Patient appears in no apparent distress at this time. Patient and/or      rb1 

      family updated on plan of care and expected duration. Pain level reassessed. Patient is     

      alert, oriented x 3, equal unlabored respirations, skin warm/dry/pink.                      

13:00 Reassessment: Patient appears in no apparent distress at this time. No changes from     rb1 

      previously documented assessment.                                                           

14:00 Reassessment: Patient appears in no apparent distress at this time. Patient and/or      rb1 

      family updated on plan of care and expected duration. Pain level reassessed. Patient is     

      alert, oriented x 3, equal unlabored respirations, skin warm/dry/pink. Pt. is playing       

      on his phone.                                                                               

15:00 Reassessment: Patient appears in no apparent distress at this time. No changes from     rb1 

      previously documented assessment.                                                           

                                                                                                  

Vital Signs:                                                                                      

10:18  / 82; Pulse 85; Resp 18; Temp 98.4; Pulse Ox 97% on R/A; Pain 5/10;              hb  

10:31 BP 97 / 71; Pulse 86; Resp 19; Pulse Ox 97% on R/A; Pain 4/10;                          rb1 

10:45  / 73; Pulse 85; Resp 18; Pulse Ox 97% on R/A;                                    rb1 

11:00 BP 98 / 74; Pulse 86; Resp 17; Pulse Ox 97% on R/A;                                     rb1 

12:00  / 79; Pulse 87; Resp 20; Pulse Ox 96% on R/A;                                    rb1 

13:00  / 83; Pulse 88; Resp 16; Pulse Ox 97% on R/A;                                    rb1 

14:00  / 82; Pulse 84; Resp 23; Pulse Ox 96% on R/A;                                    rb1 

15:00  / 86; Pulse 88; Resp 21; Pulse Ox 98% on R/A; Pain 2/10;                         rb1 

                                                                                                  

ED Course:                                                                                        

10:08 Patient arrived in ED.                                                                  sb2 

10:09 Demetrio Russ MD is Private Physician.                                                sb2 

10:13 Kirk Palomo[facility] LYSSA [facility]                                                           pm1 

10:13 Antonio Michele MD is Attending Physician.                                                pm1 

10:15 Patrica Mccullough, RN is Primary Nurse.                                                   rb1 

10:15 Patient has correct armband on for positive identification. Placed in gown. Bed in low  rb1 

      position. Call light in reach. Side rails up X 1. Cardiac monitor on. Pulse ox on. NIBP     

      on.                                                                                         

10:18 Triage completed.                                                                       hb  

10:18 Arm band placed on right wrist.                                                         hb  

10:48 Initial lab(s) drawn, by me, sent to lab. EKG done, by ED staff, reviewed by Kirk    em1 

      Stacia NP. Inserted saline lock: 20 gauge in right antecubital area, using aseptic         

      technique. Blood collected.                                                                 

10:56 X-ray completed. Portable x-ray completed in exam room. Patient tolerated procedure     la2 

      well.                                                                                       

11:00 XRAY Chest (1 view) In Process Unspecified.                                             EDMS

12:00 CT completed. Patient tolerated procedure well. Patient moved back from CT.             kw1 

12:01 CT Chest For PE Angio In Process Unspecified.                                           EDMS

12:36 Joaquim Fontaine DO is Hospitalizing Provider.                                           pm1 

14:54 No provider procedures requiring assistance completed. Patient admitted, IV remains in  sg  

      place. intact, No redness/swelling at site.                                                 

                                                                                                  

Administered Medications:                                                                         

11:25 Drug: NS 0.9% 500 ml Route: IV; Rate: bolus; Site: right antecubital;                   rb1 

12:08 Follow up: IV Status: Completed infusion                                                rb1 

11:25 Drug: fentaNYL (PF) 25 mcg Route: IVP; Site: right antecubital;                         rb1 

11:40 Follow up: Response: No adverse reaction; Pain is decreased                             rb1 

12:26 Drug: Magnesium Sulfate 1 grams Route: IVPB; Infused Over: 1 hrs; Site: right           rb1 

      antecubital;                                                                                

13:38 Follow up: IV Status: Completed infusion                                                rb1 

15:05 Not Given (provider discretion): Nitroglycerin 0.4 mg Sublingual once; every five       rb1 

      minute if needed x3                                                                         

                                                                                                  

                                                                                                  

Outcome:                                                                                          

12:36 Decision to Hospitalize by Provider.                                                    pm1 

14:52 Admitted to Tele accompanied by tech, via stretcher, room 409, with chart, Report       sg  

      called to  Trina MENDOZA                                                                        

14:52 Condition: stable                                                                           

14:52 Instructed on the need for admit, Demonstrated understanding of instructions.               

15:10 Patient left the ED.                                                                    rb1 

                                                                                                  

Signatures:                                                                                       

Dispatcher [employer]                           EDMS                                                 

Gray Jacob RN                         RN   Larry Oswald                               em1                                                  

Patrica Mccullough RN                     RN   rb1                                                  

Kirk Palomo, NP                    NP   pm1                                                  

Yesenia Michaels RN                     RN                                                      

Chelsi Downing2                                                  

Riri Mills1                                                  

Krys Florez2                                                  

                                                                                                  

Corrections: (The following items were deleted from the chart)                                    

15:30 15:29 Patient left the ED. rb1                                                          rb1 

                                                                                                  

**************************************************************************************************

## 2018-08-12 NOTE — EDPHYS
Physician Documentation                                                                           

 [facility]                                                                

Name: Van Justice                                                                               

Age: 58 yrs                                                                                       

Sex: Male                                                                                         

: 1960                                                                                   

MRN: [id number]                                                                                   

Arrival Date: 2018                                                                          

Time: 10:08                                                                                       

Account#: [id number]                                                                            

Bed 13                                                                                            

Private MD: Demetrio Russ E                                                                     

ED Physician Antonio Michele                                                                         

HPI:                                                                                              

                                                                                             

11:00 This 58 yrs old  Male presents to ER via Ambulatory with complaints of Chest   pm1 

      pain and shortness of breath.                                                               

11:00 The patient or guardian reports chest pain that is located primarily in the mid-sternal pm1 

      area. Onset: 3 day(s) ago. The pain radiates to from sternum to left and right chest        

      and to mid back. Associated signs and symptoms: Pertinent positives: shortness of           

      breath, Pertinent negatives: abdominal pain, cough, dizziness, nausea, palpitations,        

      vomiting. The chest pain is described as a pressure. Duration: The patient or guardian      

      reports multiple episodes. Modifying factors: The symptoms are alleviated by Sitting        

      up. the symptoms are aggravated by worse with lying down. Severity of pain: in the          

      emergency department the pain is actually worse. The patient has been recently been         

      admitted at [facility], 2018 for STEMI, single stent to        

      LAD. Patient reports compliance with medications and has stopped smoking since MI.          

      Patient seen by Dr. Russ on 2018 for follow up post admission. Patient without       

      any complaints at follow up visit..                                                         

                                                                                                  

Historical:                                                                                       

- Allergies:                                                                                      

10:20 NKA;                                                                                    hb  

- Home Meds:                                                                                      

10:20 atorvastatin oral oral [Active]; Metronidazole Oral [Active]; metoprolol succinate oral hb  

      oral [Active]; Colchicine Oral [Active]; aspirin Oral [Active];                             

- PMHx:                                                                                           

10:20 Diabetes - NIDDM; Hypertension;                                                         hb  

- PSHx:                                                                                           

10:20 Heart stents;                                                                           hb  

                                                                                                  

- Immunization history:: Adult Immunizations up to date.                                          

- Social history:: Smoking status: Patient/guardian denies using tobacco, the patient             

  reports quitting approximately .25 years ago.                                                   

- Ebola Screening: : No symptoms or risks identified at this time.                                

                                                                                                  

                                                                                                  

ROS:                                                                                              

11:00 Constitutional: Negative for fever, chills, and weight loss, Eyes: Negative for injury, pm1 

      pain, redness, and discharge, ENT: Negative for injury, pain, and discharge, Neck:          

      Negative for injury, pain, and swelling.                                                    

11:00 Abdomen/GI: Negative for abdominal pain, nausea, vomiting, diarrhea, and constipation,      

      Back: Negative for injury and pain, : Negative for injury, bleeding, discharge, and       

      swelling, MS/Extremity: Negative for injury and deformity, Skin: Negative for injury,       

      rash, and discoloration, Neuro: Negative for headache, weakness, numbness, tingling,        

      and seizure.                                                                                

11:00 Cardiovascular: Positive for chest pain, orthopnea, Negative for edema, palpitations.       

11:00 Respiratory: Positive for shortness of breath, Negative for cough, sputum production,       

      wheezing.                                                                                   

                                                                                                  

Exam:                                                                                             

11:00 Constitutional:  This is a well developed, well nourished patient who is awake, alert,  pm1 

      and in no acute distress. Head/Face:  Normocephalic, atraumatic. Eyes:  Pupils equal        

      round and reactive to light, extra-ocular motions intact.  Lids and lashes normal.          

      Conjunctiva and sclera are non-icteric and not injected.  Cornea within normal limits.      

      Periorbital areas with no swelling, redness, or edema. ENT:  Nares patent. No nasal         

      discharge, no septal abnormalities noted.  Tympanic membranes are normal and external       

      auditory canals are clear.  Oropharynx with no redness, swelling, or masses, exudates,      

      or evidence of obstruction, uvula midline.  Mucous membranes moist. Neck:  Trachea          

      midline, no thyromegaly or masses palpated, and no cervical lymphadenopathy.  Supple,       

      full range of motion without nuchal rigidity, or vertebral point tenderness.  No            

      Meningismus. Chest/axilla:  Normal chest wall appearance and motion.  Nontender with no     

      deformity.  No lesions are appreciated. Cardiovascular:  Regular rate and rhythm with a     

      normal S1 and S2.  No gallops, murmurs, or rubs.  No pulse deficits. Respiratory:           

      Lungs have equal breath sounds bilaterally, clear to auscultation and percussion.  No       

      rales, rhonchi or wheezes noted.  No increased work of breathing, no retractions or         

      nasal flaring. Abdomen/GI:  Soft, non-tender, with normal bowel sounds.  No distension      

      or tympany.  No guarding or rebound.  No evidence of tenderness throughout. Back:  No       

      spinal tenderness.  No costovertebral tenderness.  Full range of motion. Skin:  Warm,       

      dry with normal turgor.  Normal color with no rashes, no lesions, and no evidence of        

      cellulitis. MS/ Extremity:  Pulses equal, no cyanosis.  Neurovascular intact.  Full,        

      normal range of motion.                                                                     

11:00 Neuro: Orientation: is normal, Motor: is normal, moves all fours, Gait: is steady, at a     

      normal pace, without difficulty.                                                            

                                                                                                  

Vital Signs:                                                                                      

10:18  / 82; Pulse 85; Resp 18; Temp 98.4; Pulse Ox 97% on R/A; Pain 5/10;              hb  

10:31 BP 97 / 71; Pulse 86; Resp 19; Pulse Ox 97% on R/A; Pain 4/10;                          rb1 

10:45  / 73; Pulse 85; Resp 18; Pulse Ox 97% on R/A;                                    rb1 

11:00 BP 98 / 74; Pulse 86; Resp 17; Pulse Ox 97% on R/A;                                     rb1 

12:00  / 79; Pulse 87; Resp 20; Pulse Ox 96% on R/A;                                    rb1 

13:00  / 83; Pulse 88; Resp 16; Pulse Ox 97% on R/A;                                    rb1 

14:00  / 82; Pulse 84; Resp 23; Pulse Ox 96% on R/A;                                    rb1 

15:00  / 86; Pulse 88; Resp 21; Pulse Ox 98% on R/A; Pain 2/10;                         rb1 

                                                                                                  

MDM:                                                                                              

10:13 Patient medically screened.                                                             pm1 

12:32 Data reviewed: vital signs. Data interpreted: Pulse oximetry: on room air is 96 %.      pm1 

      Interpretation: normal. Counseling: I had a detailed discussion with the patient and/or     

      guardian regarding: the historical points, exam findings, and any diagnostic results        

      supporting the discharge/admit diagnosis, lab results, radiology results, the need for      

      further work-up and treatment in the hospital.                                              

13:07 Physician consultation: Joaquim Fontaine DO was contacted at 13:07, regarding admission,   pm1 

      would like further tests performed, CRP, sed rate, and Hemoglobin A1c, in the emergency     

      department to see patient at 13:08.                                                         

                                                                                                  

                                                                                             

10:26 Order name: Basic Metabolic Panel; Complete Time: 11:32                                 pm                                                                                             

10:26 Order name: CBC with Diff; Complete Time: 11:11                                         pm                                                                                             

10:26 Order name: Ckmb; Complete Time: 11:32                                                  pm                                                                                             

10:26 Order name: CPK; Complete Time: 11:32                                                   pm                                                                                             

10:26 Order name: LFT's; Complete Time: 11:32                                                 pm                                                                                             

10:26 Order name: Magnesium; Complete Time: 11:32                                             pm                                                                                             

10:26 Order name: NT PRO-BNP; Complete Time: 11:32                                            pm                                                                                             

10:26 Order name: PT-INR; Complete Time: 11:31                                                pm                                                                                             

10:26 Order name: Ptt, Activated; Complete Time: 11:31                                        pm                                                                                             

10:26 Order name: Troponin (emerg Dept Use Only); Complete Time: 11:31                        pm                                                                                             

12:26 Order name: Procalcitonin; Complete Time: 15:26                                         pm                                                                                             

12:26 Order name: Blood Culture Adult (2)                                                     pm                                                                                             

13:07 Order name: Sed Rate; Complete Time: 15:26                                              pm                                                                                             

13:07 Order name: CRP; Complete Time: 15:26                                                   pm                                                                                             

10:26 Order name: XRAY Chest (1 view); Complete Time: 12:17                                   pm                                                                                             

10:26 Order name: EKG; Complete Time: 10:27                                                   pm                                                                                             

10:26 Order name: Cardiac monitoring; Complete Time: 11:22                                    pm                                                                                             

10:26 Order name: EKG - Nurse/Tech; Complete Time: 10:48                                      pm                                                                                             

10:26 Order name: IV Saline Lock; Complete Time: 10:48                                        pm                                                                                             

10:26 Order name: Labs collected and sent; Complete Time: 10:48                               pm                                                                                             

10:26 Order name: O2 Per Protocol; Complete Time: 11:22                                       pm                                                                                             

10:26 Order name: O2 Sat Monitoring; Complete Time: 11:22                                     pm                                                                                             

10:26 Order name: Urine Dipstick-Ancillary (obtain specimen)                                  pm                                                                                             

11:42 Order name: CT Chest For PE Angio; Complete Time: 12:17                                 pm                                                                                             

13:07 Order name: HgA1c                                                                       pm1 

                                                                                                  

Administered Medications:                                                                         

11:25 Drug: NS 0.9% 500 ml Route: IV; Rate: bolus; Site: right antecubital;                   rb1 

12:08 Follow up: IV Status: Completed infusion                                                rb1 

11:25 Drug: fentaNYL (PF) 25 mcg Route: IVP; Site: right antecubital;                         rb1 

11:40 Follow up: Response: No adverse reaction; Pain is decreased                             rb1 

12:26 Drug: Magnesium Sulfate 1 grams Route: IVPB; Infused Over: 1 hrs; Site: right           rb1 

      antecubital;                                                                                

13:38 Follow up: IV Status: Completed infusion                                                rb1 

15:05 Not Given (provider discretion): Nitroglycerin 0.4 mg Sublingual once; every five       rb1 

      minute if needed x3                                                                         

                                                                                                  

                                                                                                  

Disposition:                                                                                      

17:51 Co-signature as Attending Physician, Antonio Michele MD.                                    rn  

                                                                                                  

Disposition:                                                                                      

18 12:36 Hospitalization ordered by Joaquim Fontaine for Observation. Preliminary             

  diagnosis are Chest pain, unspecified, Shortness of breath.                                     

- Bed requested for Telemetry/MedSurg (observation).                                              

- Status is Observation.                                                                      rb1 

- Condition is Stable.                                                                            

- Problem is new.                                                                                 

- Symptoms have improved.                                                                         

UTI on Admission? No                                                                              

                                                                                                  

                                                                                                  

                                                                                                  

Signatures:                                                                                       

Dispatcher [employer]                           EDMS                                                 

Antonio Michele MD MD rn Barber, Rebecca, RN                     RN   rb1                                                  

Kirk Palomo, NP                    NP   pm1                                                  

Yesenia Michaels RN RN                                                      

Radha Dennis RN RN   df                                                   

Sosa Riley                                                   

                                                                                                  

Corrections: (The following items were deleted from the chart)                                    

14:02 12:36 Hospitalization Ordered by Joaquim Fontaine DO for Observation. Preliminary          eb  

      diagnosis is Chest pain, unspecified; Shortness of breath. Bed requested for                

      Telemetry/MedSurg (observation). Status is Observation. Condition is Stable. Problem is     

      new. Symptoms have improved. UTI on Admission? No. pm1                                      

14:16 14:02 2018 12:36 Hospitalization Ordered by Joaquim Fontaine DO for Observation.     df  

      Preliminary diagnosis is Chest pain, unspecified; Shortness of breath. Bed requested        

      for Telemetry/MedSurg (observation). Status is Observation. Condition is Stable.            

      Problem is new. Symptoms have improved. UTI on Admission? No. eb                            

15:29 14:16 2018 12:36 Hospitalization Ordered by Joaquim Fontaine DO for Observation.     rb1 

      Preliminary diagnosis is Chest pain, unspecified; Shortness of breath. Bed requested        

      for Telemetry/MedSurg (observation). Status is Observation. Condition is Stable.            

      Problem is new. Symptoms have improved. UTI on Admission? No. df                            

                                                                                                  

**************************************************************************************************

## 2018-08-12 NOTE — RAD REPORT
EXAM DESCRIPTION:  RAD - Chest Single View - 8/12/2018 11:00 am

 

CLINICAL HISTORY:  Chest pain

 

COMPARISON:  July 18

 

TECHNIQUE:  AP portable chest image was obtained 1033 hours .

 

FINDINGS:  No peripheral mass or consolidation. Interstitial markings are prominent and could reflect
 interstitial edema or infiltrate. Heart size is normal limits. Trachea is midline. No pneumothorax o
r enlarging pleural effusion. Left pleural fluid is suspected. No gross bony abnormality seen. No acu
te aortic findings suspected.

 

IMPRESSION:  Small left pleural effusion.

 

Diffuse interstitial opacification from edema or infiltrate.

## 2018-08-12 NOTE — P.HP
Certification for Inpatient


Patient admitted to: Observation


With expected LOS: <2 Midnights


Patient will require the following post-hospital care: None


Practitioner: I am a practitioner with admitting privileges, knowledge of 

patient current condition, hospital course, and medical plan of care.


Services: Services provided to patient in accordance with Admission 

requirements found in Title 42 Section 412.3 of the Code of Federal Regulations





Patient History


Date of Service: 18


Primary Care Provider: Dr. Russ; Cardiology-Dr. Sandoval


Reason for admission: chest pain and shortness of breath


History of Present Illness: 





57 yo CM presented to the ER with chest pain and shortness of breath. 





He was recently hospitalized on 2018 for an acute anterior MI. On that day 

hew as immediately taken for heart catheterization. He was found to have normal 

RCA and circumflex, but he was found to have totally occluded LAD. He has a 

stent placed at that time. He was also found to have CHF with EF of 35% and a 

small pericardial effusion. He was noted in having pleural effusion. Other new 

findings was DM and hepatitis. It was thought that he may also had 

pericarditis. He was sent home with ASA, Effient, Metoprolol, Lipitor and 

Colchocine. 





Since the last 3 days he has noted increased shortness of breath and chest 

pain. Chest pain is mainly to the center of the chest and is diffuse. It 

radiates to both sides. He is not able to get comfortable. Pain is worse when 

lying down. No fever or chills noted. No nausea or vomiting noted. He came to 

the ER for evaluation. 





In the ER CXR showed pulmonary edema suspicious for CHF. CT chest showed left 

greater than right pleural effusion. Prominent pericardial effusion noted but 

unchanged since July. BNP elevated at 4257, Mag 1.6, Trop 0.02 and WBC 12.8. He 

was admitted for further treatment. 





When I saw him in the ER, he remained stable. BP around 110 sys. HR stable. He 

was not in respiratory distress. He admits history of alcohol and tobacco but 

quit last month. He has DM but not taking medication. He has CHF but not on 

diurectic therapy. He is to see GI soon to address his new diagnosis of 

Hepatitis C. He does not take any medication for possible COPD


Allergies





No Known Allergies Allergy (Unverified 18 09:03)


 





Home medications list reviewed: Yes


Home Medications: 








Aspirin 81 mg PO DAILY #30 tab.chew 18 


Atorvastatin Calcium [Lipitor] 80 mg PO BEDTIME #30 tab 18 


Colchicine 0.6 mg PO BID 10 Days #20 tablet 18 


Prasugrel Hydrochloride [Effient] 10 mg PO DAILY #30 tab 18 


Cefdinir [Omnicef] 300 mg PO BID #14 capsule 18 


Metoprolol Tartrate [Lopressor*] 50 mg PO BID #60 tab 18 


metroNIDAZOLE [Flagyl] 500 mg PO Q8H #21 tablet 18 








- Past Medical/Surgical History


Diabetic: Yes


-: DM Type 2


-: HTN


-: Hyperlipidemia


-: CAD, Stent to LAD(2018)


-: Hepatitis C


-: Former tobacco use


-: Former alcohol use


-: COPD


-: Jaw surgery (broken jaw)


-: Heel surgery


Psychosocial/ Personal History: He is . He has 1 child. He does not work





- Family History


  ** Mother


-: Heart disease


Notes:  suddenly from heart attack.





  ** Father


-: Cancer, Liver disease


Notes: liver cancer/ alcoholic





- Social History


Smoking Status: Former smoker


Alcohol use: No


CD- Drugs: No


Caffeine use: No


Place of Residence: Home





Review of Systems


General: As per HPI


Eyes: Unremarkable


ENT: Unremarkable


Respiratory: Shortness of Breath, SOB with Excertion, As per HPI


Cardiovascular: Chest Pain, As per HPI


Gastrointestinal: Unremarkable


Genitourinary: Unremarkable


Musculoskeletal: Back Pain, Unremarkable


Integumentary: Unremarkable


Neurological: Unremarkable


Lymphatics: Unremarkable





Physical Examination





- Physical Exam


General: Alert, In no apparent distress, Oriented x3, Cooperative


HEENT: Atraumatic, Normocephalic, PERRLA, Mucous membr. moist/pink


Neck: Supple, No Thyromegaly


Respiratory: Diminished ( bilaterally), Expiratory wheezes (minimal )


Cardiovascular: Normal pulses, Regular rate/rhythm


Gastrointestinal: Normal bowel sounds, Soft and benign, Non-distended, No 

ascites, No tenderness, No masses, No rebound, No guarding


Musculoskeletal: No erythema, No tenderness, No warmth


Integumentary: No tenderness/swelling, No erythema, No warmth, No cyanosis


Neurological: Normal speech, Normal strength at 5/5 x4 extr, Normal tone, 

Normal affect


Lymphatics: No axilla or inguinal lymphadenopathy





- Studies


Laboratory Data (last 24 hrs)





18 10:45: PT 11.5, INR 0.97, APTT 29.1


18 10:45: WBC 12.8 H, Hgb 14.0, Hct 41.3, Plt Count 313


18 10:45: Sodium 138, Potassium 4.2, BUN 16, Creatinine 0.70, Glucose 245 

H, Magnesium 1.6 L D, Total Bilirubin 0.8, AST 23, ALT 50, Alkaline Phosphatase 

69








Assessment and Plan





- Problems (Diagnosis)


(1) Chest pain


Current Visit: Yes   Status: Acute   


Plan: 


Likely multifactorial. Recent 2018 heart cath for Acute anterior MI with 

placement of stent to LAD for complete occlusion. He was found to have CHF with 

EF of 35% and small pericardial effusion at that time. He was sent home with ASA

, Effient, Metoprolol, Lipitor and Colchicine(for 10days). I suspect Chest pain 

and SOB maybe related to CHF, systolic [id number]%, exacerbation 

complicated with possible pericarditis and suspect COPD exacerbation with 

bilateral effusion. Will consult Cardiology and Pulmonary to further address. 

Will start Lasix 20 mg IV BID for CHF/Pleural effusion and pericardial 

effusion. Will also start SoluMedrol for COPD exacerbation and Pericarditis. 

Will check CRP, ESR and Procalcitonin. Will also start medication for COPD. He 

is a former smoker and no longer drinks alcohol. He also has Hepatitis C which 

is a new diagnosis. He has DM that is not treated, therefore will start 

insulin. Last A1c was 8.8. Will order ECHO to assess pericardial effusion 

tomorrow. Will recheck CXR in the am. Await recommendation from Cardiology and 

Pulmonary. 








(2) Shortness of breath


Current Visit: Yes   Status: Acute   


Plan: 


as above. Likely sys. CHF and COPD exacerbation complicated with suspected 

pericarditis, bilateral pleural effusions, and recent CAD-stent to LAD. 








(3) CHF (congestive heart failure)


Current Visit: Yes   Status: Acute   


Plan: 


Continue as above. Will start Lasix. Monitor CXR. Obtain ECHO. Consult 

Cardiology. 


Qualifiers: 


   Heart failure type: systolic   Heart failure chronicity: acute on chronic   

Qualified Code(s): I50.23 - Acute on chronic systolic (congestive) heart 

failure   





(4) Pleural effusion


Current Visit: Yes   Status: Acute   


Plan: 


as above. Will start Lasix. Recheck CXR. Consult Pulmonary








(5) Pericardial effusion


Current Visit: Yes   Status: Suspected   


Plan: 


Will check CRP, ESR. Will start Solumedrol. ECHO obtained. Cardiology 

consulted. 








(6) CAD (coronary artery disease)


Current Visit: Yes   Status: Chronic   


Plan: 


Recent stent to LAD for total occlusion on 2018. Will monitor cardiac 

enzymes. Consult Cardiology. Continue with Effient, Decrease Metoprolol, 

Restart Lipitor. 








(7) HTN (hypertension)


Current Visit: Yes   Status: Chronic   


Plan: 


Will decrease Metoprolol. Will monitor and adjust


Qualifiers: 


   Hypertension type: essential hypertension   Qualified Code(s): I10 - 

Essential (primary) hypertension   





(8) Hyperlipidemia


Current Visit: Yes   Status: Chronic   


Plan: 


Continue with Lipitor. 








(9) COPD (chronic obstructive pulmonary disease)


Current Visit: Yes   Status: Suspected   


Plan: 


Will start Solumedrol and COPD mediation. Recheck CXR in the am. Will consult 

Pulmonary to address. 


Qualifiers: 


   COPD type: COPD with acute exacerbation   Qualified Code(s): J44.1 - Chronic 

obstructive pulmonary disease with (acute) exacerbation   





(10) Diabetes mellitus


Current Visit: Yes   Status: Chronic   


Plan: 


Patient not aware of his diagnosis. Will teach on DM. Will start insulin 

sliding scale. 


Qualifiers: 


   Diabetes mellitus type: type 2   Diabetes mellitus long term insulin use: 

without long term use   Diabetes mellitus complication status: with other 

specified complication   Qualified Code(s): E11.69 - Type 2 diabetes mellitus 

with other specified complication   





(11) Hepatitis C


Current Visit: Yes   Status: Chronic   


Plan: 


Will see GI as outpatient to address. 


Qualifiers: 


   Viral hepatitis chronicity: chronic   Hepatic coma status: without hepatic 

coma   Qualified Code(s): B18.2 - Chronic viral hepatitis C   





(12) Pericarditis


Current Visit: Yes   Status: Suspected   


Plan: 


Will order CRP, ESR. Will start Solumedrol. Consult Cardiology. Obtain ECHO. 

Blood cultures obtained. 


Qualifiers: 


   Pericarditis type: idiopathic 





(13) Cholelithiasis


Current Visit: No   Status: Chronic   


Plan: 


Recently evaluated by Surgery. This can be addressed as outpatient 6 months 

after 2018 MI/stent. 


Qualifiers: 


   Cholelithiasis location: gallbladder   Cholecystitis presence: with 

cholecystitis   Cholecystitis acuity: unspecified acuity   Biliary obstruction: 

without biliary obstruction   Qualified Code(s): K80.10 - Calculus of 

gallbladder with chronic cholecystitis without obstruction   





(14) Former tobacco use


Current Visit: Yes   Status: Chronic   


Plan: 


May need nicotine patch. 








(15) Former consumption of alcohol


Current Visit: Yes   Status: Chronic   


Plan: 


He no longer drinks alcohol. 





Discharge Plan: Home


Plan to discharge in: 24 Hours





- Advance Directives


Does patient have a Living Will: No


Does patient have a Durable POA for Healthcare: No





- Code Status/Comfort Care


Code Status Assessed: Yes


Time Spent Managing Pts Care (In Minutes): 55

## 2018-08-12 NOTE — EKG
Test Date:    2018-08-12               Test Time:    10:36:26

Technician:   ELM                                    

                                                     

MEASUREMENT RESULTS:                                       

Intervals:                                           

Rate:         86                                     

WA:           134                                    

QRSD:         84                                     

QT:           382                                    

QTc:          457                                    

Axis:                                                

P:            42                                     

WA:           134                                    

QRS:          65                                     

T:            130                                    

                                                     

INTERPRETIVE STATEMENTS:                                       

                                                     

Normal sinus rhythm

Anteroseptal infarct, age undetermined

T wave abnormality, consider inferolateral ischemia

Abnormal ECG

Compared to ECG 07/18/2018 07:44:26

T-wave abnormality now present

Possible ischemia now present

Sinus tachycardia no longer present

Myocardial infarct finding still present



Electronically Signed On 08-12-18 15:14:57 CDT by Jerod Lockett

## 2018-08-13 LAB
BUN BLD-MCNC: 16 MG/DL (ref 7–18)
GLUCOSE SERPLBLD-MCNC: 240 MG/DL (ref 74–106)
HCT VFR BLD CALC: 40 % (ref 39.6–49)
LYMPHOCYTES # SPEC AUTO: 3.3 K/UL (ref 0.7–4.9)
MAGNESIUM SERPL-MCNC: 2 MG/DL (ref 1.8–2.4)
MCH RBC QN AUTO: 29 PG (ref 27–35)
MCV RBC: 84 FL (ref 80–100)
PMV BLD: 8.7 FL (ref 7.6–11.3)
POTASSIUM SERPL-SCNC: 3.9 MMOL/L (ref 3.5–5.1)
RBC # BLD: 4.77 M/UL (ref 4.33–5.43)

## 2018-08-13 RX ADMIN — ATORVASTATIN CALCIUM SCH MG: 80 TABLET, FILM COATED ORAL at 20:18

## 2018-08-13 RX ADMIN — ARFORMOTEROL TARTRATE SCH MCG: 15 SOLUTION RESPIRATORY (INHALATION) at 20:36

## 2018-08-13 RX ADMIN — ASPIRIN SCH MG: 81 TABLET, COATED ORAL at 08:44

## 2018-08-13 RX ADMIN — Medication SCH ML: at 08:48

## 2018-08-13 RX ADMIN — FUROSEMIDE SCH MG: 20 TABLET ORAL at 16:51

## 2018-08-13 RX ADMIN — HUMAN INSULIN SCH UNIT: 100 INJECTION, SOLUTION SUBCUTANEOUS at 20:18

## 2018-08-13 RX ADMIN — ARFORMOTEROL TARTRATE SCH MCG: 15 SOLUTION RESPIRATORY (INHALATION) at 08:16

## 2018-08-13 RX ADMIN — HUMAN INSULIN SCH UNIT: 100 INJECTION, SOLUTION SUBCUTANEOUS at 08:45

## 2018-08-13 RX ADMIN — FUROSEMIDE SCH MG: 10 INJECTION, SOLUTION INTRAVENOUS at 08:45

## 2018-08-13 RX ADMIN — HUMAN INSULIN SCH UNIT: 100 INJECTION, SOLUTION SUBCUTANEOUS at 12:59

## 2018-08-13 RX ADMIN — ENOXAPARIN SODIUM SCH MG: 40 INJECTION SUBCUTANEOUS at 08:43

## 2018-08-13 RX ADMIN — Medication SCH ML: at 20:18

## 2018-08-13 RX ADMIN — PRASUGREL SCH MG: 10 TABLET, FILM COATED ORAL at 11:22

## 2018-08-13 RX ADMIN — HYDROCODONE BITARTRATE AND ACETAMINOPHEN PRN TAB: 7.5; 325 TABLET ORAL at 10:09

## 2018-08-13 RX ADMIN — METOPROLOL TARTRATE SCH: 25 TABLET ORAL at 06:00

## 2018-08-13 RX ADMIN — HYDROCODONE BITARTRATE AND ACETAMINOPHEN PRN TAB: 7.5; 325 TABLET ORAL at 22:08

## 2018-08-13 RX ADMIN — HUMAN INSULIN SCH UNIT: 100 INJECTION, SOLUTION SUBCUTANEOUS at 16:50

## 2018-08-13 RX ADMIN — HYDROCODONE BITARTRATE AND ACETAMINOPHEN PRN TAB: 7.5; 325 TABLET ORAL at 03:57

## 2018-08-13 RX ADMIN — HYDROCODONE BITARTRATE AND ACETAMINOPHEN PRN TAB: 7.5; 325 TABLET ORAL at 16:11

## 2018-08-13 RX ADMIN — METOPROLOL TARTRATE SCH MG: 25 TABLET ORAL at 17:18

## 2018-08-13 NOTE — P.PN
Subjective


Date of Service: 08/13/18


Primary Care Provider: Dr. Russ; Cardiology-Dr. Sandoval


Chief Complaint: chest pain and shortness of breath


Subjective: Improving (Patient feels improved.  Patient responding well to 

steroids and diuretic therapy.)





Physical Examination





- Vital Signs


Temperature: 98.7 F


Blood Pressure: 106/66


Pulse: 98


Respirations: 18


Pulse Ox (%): 95





- Physical Exam


General: Alert, In no apparent distress, Oriented x3, Cooperative


HEENT: Atraumatic


Neck: Supple


Respiratory: Clear to auscultation bilaterally


Cardiovascular: Normal pulses, Regular rate/rhythm


Gastrointestinal: Normal bowel sounds, Soft and benign, Non-distended, No 

tenderness, No masses, No rebound, No guarding


Musculoskeletal: No erythema, No tenderness, No warmth


Integumentary: No tenderness/swelling, No erythema, No warmth, No cyanosis


Neurological: Normal speech, Normal strength at 5/5 x4 extr, Normal tone, 

Normal affect


Lymphatics: No axilla or inguinal lymphadenopathy





- Studies


Laboratory Data (last 24 hrs)





08/12/18 10:45: PT 11.5, INR 0.97, APTT 29.1


08/12/18 10:45: WBC 12.8 H, Hgb 14.0, Hct 41.3, Plt Count 313


08/12/18 10:45: Sodium 138, Potassium 4.2, BUN 16, Creatinine 0.70, Glucose 245 

H, Magnesium 1.6 L D, Total Bilirubin 0.8, AST 23, ALT 50, Alkaline Phosphatase 

69





Medications List Reviewed: Yes





Assessment & Plan





- Problems (Diagnosis)


(1) Chest pain


Onset Date: 08/13/18   Current Visit: Yes   Status: Acute   


Plan: 


Likely multifactorial. Recent 7/17/2018 heart cath for Acute anterior MI with 

placement of stent to LAD for complete occlusion. He was found to have CHF with 

EF of 35% and small pericardial effusion at that time. He was sent home with ASA

, Effient, Metoprolol, Lipitor and Colchicine(for 10days). I suspect Chest pain 

and SOB maybe related to CHF, systolic [id number]%, exacerbation 

complicated with possible pericarditis and suspect COPD exacerbation with 

bilateral effusion. 





 Patient has improved.  Will change IV steroids to oral.  Cardiology recommends 

no further intervention from a cardiac standpoint.  Patient will need to be 

treated for his CHF.  Will transition Lasix to oral.  Will teach on 1500 cc per 

day fluid restriction.  Patient likely has mild pericarditis.  Cardiology 

recommends colchicine p.r.n..  Patient will likely need steroids for about 10 

days as well.  Will discuss with pulmonology about starting medication for 

COPD.  Anticipate likely discharge later today or tomorrow.  Will discuss with 

cardiology and pulmonology.  











(2) Shortness of breath


Onset Date: 08/13/18   Current Visit: Yes   Status: Acute   


Plan: 


This has improved with Lasix and steroids.  Consider possible discharge later 

today if okay with pulmonology and Cardiology.  Will transition Lasix and 

steroids to oral medication.








(3) CHF (congestive heart failure)


Onset Date: 08/13/18   Current Visit: Yes   Status: Acute   


Plan: 


Continue as above.  Obtain echocardiogram.  Will discuss with cardiology.  Will 

transition Lasix to oral.  Will teach on 1500 cc per day fluid restriction.


Qualifiers: 


   Heart failure type: systolic   Heart failure chronicity: acute on chronic   

Qualified Code(s): I50.23 - Acute on chronic systolic (congestive) heart 

failure   





(4) Pleural effusion


Onset Date: 08/13/18   Current Visit: Yes   Status: Acute   


Plan: 


 Pleural effusions improved.  Continue as above.








(5) Pericardial effusion


Onset Date: 08/13/18   Current Visit: Yes   Status: Suspected   


Plan: 


Obtain echocardiogram.  Will discuss with cardiology.  Anticipate no 

intervention required








(6) CAD (coronary artery disease)


Onset Date: 08/13/18   Current Visit: Yes   Status: Chronic   


Plan: 


Recent stent to LAD for total occlusion on 7/17/2018. Will monitor cardiac 

enzymes. Consult Cardiology. Continue with Effient, Decrease Metoprolol, 

Restart Lipitor. 








(7) HTN (hypertension)


Onset Date: 08/13/18   Current Visit: Yes   Status: Chronic   


Plan: 


Medications adjusted.


Qualifiers: 


   Hypertension type: essential hypertension   Qualified Code(s): I10 - 

Essential (primary) hypertension   





(8) Hyperlipidemia


Onset Date: 08/13/18   Current Visit: Yes   Status: Chronic   


Plan: 


Continue with Lipitor. 








(9) COPD (chronic obstructive pulmonary disease)


Onset Date: 08/13/18   Current Visit: Yes   Status: Suspected   


Plan: 


Will transition steroid to oral.  Will continue with COPD medication.  Will 

discuss with pulmonology.


Qualifiers: 


   COPD type: COPD with acute exacerbation   Qualified Code(s): J44.1 - Chronic 

obstructive pulmonary disease with (acute) exacerbation   





(10) Diabetes mellitus


Onset Date: 08/13/18   Current Visit: Yes   Status: Chronic   


Plan: 


Patient understands now that he has diabetes.  At discharge patient will need 

to continue with Glucophage.  Patient will need close follow up for better 

control.


Qualifiers: 


   Diabetes mellitus type: type 2   Diabetes mellitus long term insulin use: 

without long term use   Diabetes mellitus complication status: with other 

specified complication   Qualified Code(s): E11.69 - Type 2 diabetes mellitus 

with other specified complication   





(11) Hepatitis C


Onset Date: 08/13/18   Current Visit: Yes   Status: Chronic   


Plan: 


Will see GI as outpatient to address. 


Qualifiers: 


   Viral hepatitis chronicity: chronic   Hepatic coma status: without hepatic 

coma   Qualified Code(s): B18.2 - Chronic viral hepatitis C   





(12) Pericarditis


Onset Date: 08/13/18   Current Visit: Yes   Status: Suspected   


Plan: 


Await echocardiogram.  Will discuss with cardiology.  Patient will require 

colchicine and steroid treatment.


Qualifiers: 


   Pericarditis type: idiopathic 





(13) Cholelithiasis


Onset Date: 08/13/18   Current Visit: Yes   Status: Chronic   


Plan: 


Recently evaluated by Surgery. This can be addressed as outpatient 6 months 

after July 2018 MI/stent. 


Qualifiers: 


   Cholelithiasis location: gallbladder   Cholecystitis presence: with 

cholecystitis   Cholecystitis acuity: unspecified acuity   Biliary obstruction: 

without biliary obstruction   Qualified Code(s): K80.10 - Calculus of 

gallbladder with chronic cholecystitis without obstruction   





(14) Former tobacco use


Onset Date: 08/13/18   Current Visit: Yes   Status: Chronic   


Plan: 


May need nicotine patch. 








(15) Former consumption of alcohol


Onset Date: 08/13/18   Current Visit: Yes   Status: Chronic   


Plan: 


He no longer drinks alcohol. 





Discharge Plan: Home


Plan to discharge in: 24 Hours


Time Spent Managing Pts Care (In Minutes): 55

## 2018-08-13 NOTE — CON
Chief Complaint:  Pain in the chest.



History Of Present Illness:  Mr. Justice has had pain in his chest ever since his stent.  He had an 
anterior wall MI in July.  He came to the hospital more than 40 hours after the onset of MI.  He had 
pressure in his chest, tightness typical of MI symptoms.  He did not come with that.  He came the nex
t day with pleuritic chest pain.  A stent was placed in his LAD.  He has a very depressed ejection fr
action and with anterior MI, and he had pericarditis.  The chest pain he has gets worse if he lays fl
at, better if he sits up, worse if he takes a breath.  It has been basically constant since his MI so
me 3 weeks ago or so.  Since he has been in the hospital, his troponins are normal.  A C-reactive pro
tein is elevated.  A sed rate has not been done, but I doubt it is elevated.  I think the patient cou
ld benefit from being on colchicine, perhaps a short course of steroids.  He does not need a cardiac 
cath or a stress test.  An echocardiogram might be useful, but we are dealing with pericardial pain, 
not an acute coronary syndrome.





CRISTOPHER/AYANNA

DD:  08/13/2018 07:03:19Voice ID:  [id number]

DT:  08/13/2018 07:33:02Report ID:  [id number]

## 2018-08-13 NOTE — ECHO
HEIGHT: 5 ft 4 in   WEIGHT: 198 lb 6.4 oz   DATE OF STUDY: 08/13/18   REFER DR: Joaquim Fontaine DO

2-DIMENSIONAL: YES

     M.MODE: YES

 DOPPLER: YES

COLOR FLOW: YES



                    TDS:  NO

PORTABLE: NO

 DEFINITY:  NO

BUBBLE STUDY: NO





DIAGNOSIS:  SHORTNESS OF BREATH/PERICARDIAL EFFUSION



CARDIAC HISTORY:  

CATHERIZATION: YES

SURGERY: NO

PROSTHETIC VALVE: NO

PACEMAKER: NO





MEASUREMENTS (cm)

    DIASTOLIC (NORMALS)                 SYSTOLIC (NORMALS)

IVSd                 1.2 (0.6-1.2)                    LA Diam 3.8 (1.9-4.0)     LVEF       
  29%  

LVIDd               5.9 (3.5-5.7)                        LVIDs      5.1 (2.0-3.5)     %FS  
        14%

LVPWd             1.3 (0.6-1.2)

Ao Diam           2.8 (2.0-3.7)



2 DIMENSIONAL ASSESSMENT:

RIGHT ATRIUM:                   NORAML

LEFT ATRIUM:       NORMAL



RIGHT VENTRICLE:            NORMAL

LEFT VENTRICLE: NORMAL



TRICUSPID VALVE:             NORMAL

MITRAL VALVE:     NORMAL



PULMONIC VALVE:             NORMAL

AORTIC VALVE:     NORMAL



PERICARDIAL EFFUSION: NONE/ EPICARDIAL FAT PAD NOTED

AORTIC ROOT:      NORMAL





LEFT VENTRICULAR WALL MOTION:     AKINESIS OF ANTERIOR, SEPTAL, APICAL SEGMENT.



DOPPLER/COLOR FLOW:     MILD MITRAL AND TRICUSPID REGURGITATION. NORMAL RIGHT VENTRICULAR 
SYSTOLIC PRESSURE.



COMMENTS:      DEPRESSED LEFT VENTRICULAR EJECTION FRACTION WITH WALL MOTION ABNORMALITY. 
MILD MITRAL AND TRICUSPID REGURGITATION.



TECHNOLOGIST:   JACIEL RIBEIRO

## 2018-08-13 NOTE — RAD REPORT
EXAM DESCRIPTION:  Brigitte Marroquin And Lat (2 Views)8/13/2018 6:48 am

 

CLINICAL HISTORY:  Shortness of breath

 

COMPARISON:  August 12th

 

FINDINGS:  Small left pleural effusion with left lower lobe atelectasis is unchanged. Tiny right pleu
ral effusion is present.

 

The interstitial pulmonary edema appears resolved. The heart remains enlarged

 

IMPRESSION:  Mild interstitial pulmonary edema appears resolved

 

No change in small left and tiny right pleural effusions

## 2018-08-13 NOTE — P.CNS
Date of Consult: 18


Primary Care Provider: Dr. Russ; Cardiology-Dr. Sandoval


Chief Complaint: chest pain and shortness of breath


History of Present Illness: 


Patient is 58 years of age admitted with some chest discomfort pressure in his 

chest associated with some shortness of breath for the past week he had an MI 

83 weeks ago the stent placement in the LAD he stops smoking since his MI for 

prior history of obstructive airways disease does not usually see any doctors 

has known relying medical problems he feels a little better.  Patient's BNP was 

elevated he does have bilateral pleural effusion left greater than the right on 

the CT scan











Allergies





No Known Allergies Allergy (Verified 18 15:35)


 





Home Medications: 








Aspirin 81 mg PO DAILY #30 tab.chew 18 


Atorvastatin Calcium [Lipitor] 80 mg PO BEDTIME #30 tab 18 


Prasugrel Hydrochloride [Effient] 10 mg PO DAILY #30 tab 18 


Metoprolol Tartrate [Lopressor*] 50 mg PO BID #60 tab 18 








- Past Medical/Surgical History


Diabetic: Yes


-: DM Type 2


-: HTN


-: Hyperlipidemia


-: CAD, Stent to LAD(2018)


-: Hepatitis C


-: Former tobacco use


-: Former alcohol use


-: COPD


-: Jaw surgery (broken jaw)


-: Heel surgery


Psychosocial/ Personal History: He is . He has 1 child. He does not work





- Family History


  ** Mother


Medical History: Heart disease


Notes:  suddenly from heart attack.





  ** Father


Medical History: Liver disease


Notes: liver cancer/ alcoholic





- Social History


Smoking Status: Current every day smoker


Alcohol use: No


CD- Drugs: No


Caffeine use: Yes


Place of Residence: Home





Review of Systems


10-point ROS is otherwise unremarkable





Physical Examination














Temp Pulse Resp BP Pulse Ox


 


 98.7 F   98 H  18   106/66   95 


 


 18 10:15  18 10:15  18 10:15  18 10:15  18 10:15








General: Alert, Oriented x3


HEENT: Atraumatic


Neck: Supple


Respiratory: Clear to auscultation bilaterally, Diminished


Cardiovascular: No edema, Regular rate/rhythm


Gastrointestinal: Normal bowel sounds, Soft and benign


Musculoskeletal: No clubbing, No swelling





- Problems


(1) Shortness of breath


Onset Date: 18   Current Visit: Yes   Status: Acute   


Plan: 


Patient is 58 years of age admitted with some chest pressure shortness of 

breath former heavy smoker quit 3 weeks ago aureus and history of MI BNP is 

elevated he probably has underlying heart failure possibly underlying chronic 

obstructive pulmonary disease echocardiogram pending I suggest adding some 

Lasix use inhalers small dose of prednisone white count is mildly elevated EKG 

shows oral MI labs reviewed patient's oxygenation is satisfactory


Possible discharge tomorrow on diuretics a bronchodilator either Symbicort or 

Advair low-dose prednisone 10 mg twice a day follow up with me in 2 weeks

## 2018-08-14 VITALS — TEMPERATURE: 98 F | DIASTOLIC BLOOD PRESSURE: 51 MMHG | SYSTOLIC BLOOD PRESSURE: 91 MMHG

## 2018-08-14 VITALS — OXYGEN SATURATION: 95 %

## 2018-08-14 LAB
BUN BLD-MCNC: 17 MG/DL (ref 7–18)
GLUCOSE SERPLBLD-MCNC: 274 MG/DL (ref 74–106)
HCT VFR BLD CALC: 39 % (ref 39.6–49)
LYMPHOCYTES # SPEC AUTO: 1.8 K/UL (ref 0.7–4.9)
MAGNESIUM SERPL-MCNC: 2.1 MG/DL (ref 1.8–2.4)
MCH RBC QN AUTO: 28.9 PG (ref 27–35)
MCV RBC: 82.5 FL (ref 80–100)
PMV BLD: 8.9 FL (ref 7.6–11.3)
POTASSIUM SERPL-SCNC: 4.1 MMOL/L (ref 3.5–5.1)
RBC # BLD: 4.73 M/UL (ref 4.33–5.43)

## 2018-08-14 RX ADMIN — HUMAN INSULIN SCH UNIT: 100 INJECTION, SOLUTION SUBCUTANEOUS at 12:28

## 2018-08-14 RX ADMIN — FUROSEMIDE SCH MG: 20 TABLET ORAL at 09:11

## 2018-08-14 RX ADMIN — ARFORMOTEROL TARTRATE SCH MCG: 15 SOLUTION RESPIRATORY (INHALATION) at 07:42

## 2018-08-14 RX ADMIN — METOPROLOL TARTRATE SCH MG: 25 TABLET ORAL at 05:18

## 2018-08-14 RX ADMIN — ENOXAPARIN SODIUM SCH MG: 40 INJECTION SUBCUTANEOUS at 09:12

## 2018-08-14 RX ADMIN — PRASUGREL SCH MG: 10 TABLET, FILM COATED ORAL at 09:12

## 2018-08-14 RX ADMIN — HUMAN INSULIN SCH UNIT: 100 INJECTION, SOLUTION SUBCUTANEOUS at 09:13

## 2018-08-14 RX ADMIN — HYDROCODONE BITARTRATE AND ACETAMINOPHEN PRN TAB: 7.5; 325 TABLET ORAL at 05:18

## 2018-08-14 RX ADMIN — ASPIRIN SCH MG: 81 TABLET, COATED ORAL at 09:11

## 2018-08-14 RX ADMIN — Medication SCH ML: at 09:17

## 2018-08-14 NOTE — P.DS
Admission Date: 08/12/18


Discharge Date: 08/14/18


Primary Care Provider: Dr. Russ; Cardiology-Dr. Sandoval


Disposition: ROUTINE DISCHARGE


Discharge Condition: GOOD


Reason for Admission: chest pain and shortness of breath


Consultations: 





Cardiology    


Pulmonology 








- Problems


(1) CHF (congestive heart failure)


Onset Date: 08/13/18   Status: Acute   


Qualifiers: 


   Heart failure type: systolic   Heart failure chronicity: acute on chronic   

Qualified Code(s): I50.23 - Acute on chronic systolic (congestive) heart 

failure   





(2) Chest pain


Onset Date: 08/13/18   Status: Acute   


Qualifiers: 


   Chest pain type: chest pain on breathing   Qualified Code(s): R07.1 - Chest 

pain on breathing; R07.81 - Pleurodynia   





(3) Pericarditis as complication of acute myocardial infarction


Status: Acute   





(4) Pleural effusion


Onset Date: 08/13/18   Status: Acute   





(5) CAD (coronary artery disease)


Onset Date: 08/13/18   Status: Chronic   


Qualifiers: 


   Coronary Disease-Associated Artery/Lesion type: native artery   Native vs. 

transplanted heart: native heart   Associated angina: with stable angina   

Qualified Code(s): I25.118 - Atherosclerotic heart disease of native coronary 

artery with other forms of angina pectoris   





(6) Diabetes mellitus


Onset Date: 08/13/18   Status: Chronic   


Qualifiers: 


   Diabetes mellitus type: type 2   Diabetes mellitus long term insulin use: 

without long term use   Diabetes mellitus complication status: with other 

specified complication   Qualified Code(s): E11.69 - Type 2 diabetes mellitus 

with other specified complication   





(7) Former tobacco use


Onset Date: 08/13/18   Status: Chronic   





(8) HTN (hypertension)


Onset Date: 08/13/18   Status: Chronic   


Qualifiers: 


   Hypertension type: essential hypertension   Qualified Code(s): I10 - 

Essential (primary) hypertension   





(9) Hepatitis C


Onset Date: 08/13/18   Status: Chronic   


Qualifiers: 


   Viral hepatitis chronicity: chronic   Hepatic coma status: without hepatic 

coma   Qualified Code(s): B18.2 - Chronic viral hepatitis C   





(10) Hyperlipidemia


Onset Date: 08/13/18   Status: Chronic   





(11) COPD (chronic obstructive pulmonary disease)


Onset Date: 08/13/18   Status: Suspected   


Qualifiers: 


   COPD type: COPD with acute exacerbation   Qualified Code(s): J44.1 - Chronic 

obstructive pulmonary disease with (acute) exacerbation   


Brief History of Present Illness: 





59 yo CM presented to the ER with chest pain and shortness of breath. 





He was recently hospitalized on 7/17/2018 for an acute anterior MI. On that day 

hew as immediately taken for heart catheterization. He was found to have normal 

RCA and circumflex, but he was found to have totally occluded LAD. He has a 

stent placed at that time. He was also found to have CHF with EF of 35% and a 

small pericardial effusion. He was noted in having pleural effusion. Other new 

findings was DM and hepatitis. It was thought that he may also had 

pericarditis. He was sent home with ASA, Effient, Metoprolol, Lipitor and 

Colchocine. 





Since the last 3 days he has noted increased shortness of breath and chest 

pain. Chest pain is mainly to the center of the chest and is diffuse. It 

radiates to both sides. He is not able to get comfortable. Pain is worse when 

lying down. No fever or chills noted. No nausea or vomiting noted. He came to 

the ER for evaluation. 





In the ER CXR showed pulmonary edema suspicious for CHF. CT chest showed left 

greater than right pleural effusion. Prominent pericardial effusion noted but 

unchanged since July. BNP elevated at 4257, Mag 1.6, Trop 0.02 and WBC 12.8. He 

was admitted for further treatment. 





When I saw him in the ER, he remained stable. BP around 110 sys. HR stable. He 

was not in respiratory distress. He admits history of alcohol and tobacco but 

quit last month. He has DM but not taking medication. He has CHF but not on 

diurectic therapy. He is to see GI soon to address his new diagnosis of 

Hepatitis C. He does not take any medication for possible COPD


Hospital Course: 





Overall during the hospital stay patient remained stable





Patient was initially admitted to the hospital for chest pain and shortness of 

breath.  Patient's chest pain and shortness of breath was most likely secondary 

to CHF exacerbation causing bilateral pleural effusion and pericarditis.  

Patient was recently admitted to the hospital and was discharged 3 weeks ago 

status post MI and was found to have pericarditis at that time and was given 

medications for it.  Patient finished a course of his Cortrosyn and then 

started having some chest pain along with shortness of breath.  Patient when 

admitted here in the hospital this time was found to also have COPD 

exacerbation and a new diagnosis of COPD after all.  Pulmonology was consulted 

who recommended the patient be started on Symbicort with 2 puffs b.i.d. along 

with low-dose steroids.  Patient had marked improvement in his symptoms.  

Patient also had cardiology consulted here in the hospital who recommended 

patient get a repeat echocardiogram done which was consistent with the ejection 

fraction of 25% which was down from 35% last time.  Patient does was started on 

Lasix and spironolactone here in the hospital.  Patient was also started back 

on cultures done here in the hospital for his pericarditis.  Cardiology was 

consulted who agreed with the above plan.  Patient had marked improvement in 

his shortness of breath and his chest pain day 3 of hospital visit and thus was 

discharged home under stable condition.  Patient was educated extensively on 

his disease process of congestive heart failure, COPD, hepatitis-C, and CAD.  

Patient was asked to follow up with his primary care provider for follow up 

care.  Patient thus was discharged home under stable condition.  The patient 

had new medications of Lasix 20 mg b.i.d..  Spironolactone 25 mg daily.  

Cultures sent to be taken twice daily.  And Symbicort inhaler along with 

prednisone.


Vital Signs/Physical Exam: 














Temp Pulse Resp BP Pulse Ox


 


 98 F   96 H  18   91/51 L  96 


 


 08/14/18 12:00  08/14/18 12:00  08/14/18 12:00  08/14/18 12:00  08/14/18 12:00








General: Alert, In no apparent distress


HEENT: Atraumatic, PERRLA, EOMI


Neck: Supple, JVD not distended


Respiratory: Clear to auscultation bilaterally, Normal air movement


Cardiovascular: Regular rate/rhythm, Normal S1 S2


Gastrointestinal: Normal bowel sounds, No tenderness


Musculoskeletal: No tenderness


Integumentary: No rashes


Neurological: Normal speech, Normal tone, Normal affect


Lymphatics: No axilla or inguinal lymphadenopathy


Laboratory Data at Discharge: 














WBC  9.1 K/uL (4.3-10.9)  D 08/14/18  05:10    


 


Hgb  13.7 g/dL (13.6-17.9)   08/14/18  05:10    


 


Hct  39.0 % (39.6-49.0)  L  08/14/18  05:10    


 


Plt Count  289 K/uL (152-406)   08/14/18  05:10    


 


PT  11.5 SECONDS (9.5-12.5)   08/12/18  10:45    


 


INR  0.97   08/12/18  10:45    


 


APTT  29.1 SECONDS (24.3-36.9)   08/12/18  10:45    


 


Sodium  140 mmol/L (136-145)   08/14/18  05:10    


 


Potassium  4.1 mmol/L (3.5-5.1)   08/14/18  05:10    


 


BUN  17 mg/dL (7-18)   08/14/18  05:10    


 


Creatinine  0.60 mg/dL (0.55-1.3)   08/14/18  05:10    


 


Glucose  274 mg/dL ()  H  08/14/18  05:10    


 


Magnesium  2.1 mg/dL (1.8-2.4)   08/14/18  05:10    


 


Total Bilirubin  0.8 mg/dL (0.2-1.0)   08/12/18  10:45    


 


AST  23 U/L (15-37)   08/12/18  10:45    


 


ALT  50 U/L (12-78)   08/12/18  10:45    


 


Alkaline Phosphatase  69 U/L ()   08/12/18  10:45    


 


Troponin I  0.03 ng/mL (0.0-0.045)   08/12/18  22:46    








Home Medications: 








Aspirin 81 mg PO DAILY #30 tab.chew 07/19/18 


Atorvastatin Calcium [Lipitor] 80 mg PO BEDTIME #30 tab 07/19/18 


Prasugrel Hydrochloride [Effient*] 10 mg PO DAILY #30 tab 07/19/18 


Metoprolol Tartrate [Lopressor*] 50 mg PO BID #60 tab 07/21/18 


Colchicine 0.6 mg PO BID #28 capsule 08/14/18 


Fluticasone/Salmeterol [Advair Hfa 115-21 Mcg Inhaler] 12 gm IH BID PRN #1 

[email or web address] 08/14/18 


Furosemide [Lasix] 20 mg PO BID #60 tablet 08/14/18 


predniSONE [Deltasone*] 10 mg PO DAILY #18 tab 08/14/18 





New Medications: 


Colchicine 0.6 mg PO BID #28 capsule


Fluticasone/Salmeterol [Advair Hfa 115-21 Mcg Inhaler] 12 gm IH BID PRN #1 

[email or web address]


 PRN Reason: Wheezing


Furosemide [Lasix] 20 mg PO BID #60 tablet


predniSONE [Deltasone*] 10 mg PO DAILY #18 tab


Patient Discharge Instructions: Please f.u with PCP and cardiologist in 1 to 2 

week post discharge.  New medication.  Lasix 20mg BID.  Colchicine BID.  Advair 

2 puff BID.  Prednisone Taper Dose


Diet: Regular


Activity: Ad josefa


Followup: 


Will Riley MD [ACTIVE - CAN ADMIT] - 1 Week


Dami Sandoval MD [ACTIVE - CAN ADMIT] - 1 Week

## 2020-07-08 ENCOUNTER — HOSPITAL ENCOUNTER (INPATIENT)
Dept: HOSPITAL 97 - ER | Age: 60
LOS: 2 days | Discharge: HOME HEALTH | DRG: 66 | End: 2020-07-10
Attending: FAMILY MEDICINE | Admitting: FAMILY MEDICINE
Payer: COMMERCIAL

## 2020-07-08 VITALS — BODY MASS INDEX: 39.4 KG/M2

## 2020-07-08 DIAGNOSIS — Z79.52: ICD-10-CM

## 2020-07-08 DIAGNOSIS — Z11.59: ICD-10-CM

## 2020-07-08 DIAGNOSIS — R47.01: ICD-10-CM

## 2020-07-08 DIAGNOSIS — Z56.0: ICD-10-CM

## 2020-07-08 DIAGNOSIS — Z79.51: ICD-10-CM

## 2020-07-08 DIAGNOSIS — E11.65: ICD-10-CM

## 2020-07-08 DIAGNOSIS — J44.9: ICD-10-CM

## 2020-07-08 DIAGNOSIS — I63.9: Primary | ICD-10-CM

## 2020-07-08 DIAGNOSIS — R29.702: ICD-10-CM

## 2020-07-08 DIAGNOSIS — I10: ICD-10-CM

## 2020-07-08 DIAGNOSIS — R51: ICD-10-CM

## 2020-07-08 DIAGNOSIS — I25.2: ICD-10-CM

## 2020-07-08 DIAGNOSIS — I25.10: ICD-10-CM

## 2020-07-08 DIAGNOSIS — E78.5: ICD-10-CM

## 2020-07-08 DIAGNOSIS — Z79.899: ICD-10-CM

## 2020-07-08 DIAGNOSIS — H53.8: ICD-10-CM

## 2020-07-08 DIAGNOSIS — Z79.82: ICD-10-CM

## 2020-07-08 DIAGNOSIS — F17.200: ICD-10-CM

## 2020-07-08 DIAGNOSIS — Z95.5: ICD-10-CM

## 2020-07-08 DIAGNOSIS — H53.9: ICD-10-CM

## 2020-07-08 LAB
ALBUMIN SERPL BCP-MCNC: 3.2 G/DL (ref 3.4–5)
ALP SERPL-CCNC: 65 U/L (ref 45–117)
ALT SERPL W P-5'-P-CCNC: 45 U/L (ref 12–78)
AST SERPL W P-5'-P-CCNC: 34 U/L (ref 15–37)
BUN BLD-MCNC: 15 MG/DL (ref 7–18)
GLUCOSE SERPLBLD-MCNC: 232 MG/DL (ref 74–106)
HCT VFR BLD CALC: 43.5 % (ref 39.6–49)
INR BLD: 0.95
LYMPHOCYTES # SPEC AUTO: 2 K/UL (ref 0.7–4.9)
MAGNESIUM SERPL-MCNC: 2 MG/DL (ref 1.8–2.4)
NT-PROBNP SERPL-MCNC: 1473 PG/ML (ref ?–125)
PMV BLD: 9.4 FL (ref 7.6–11.3)
POTASSIUM SERPL-SCNC: 4.1 MMOL/L (ref 3.5–5.1)
RBC # BLD: 5 M/UL (ref 4.33–5.43)
TROPONIN (EMERG DEPT USE ONLY): < 0.02 NG/ML (ref 0–0.04)

## 2020-07-08 PROCEDURE — 83735 ASSAY OF MAGNESIUM: CPT

## 2020-07-08 PROCEDURE — 85610 PROTHROMBIN TIME: CPT

## 2020-07-08 PROCEDURE — 84443 ASSAY THYROID STIM HORMONE: CPT

## 2020-07-08 PROCEDURE — 93880 EXTRACRANIAL BILAT STUDY: CPT

## 2020-07-08 PROCEDURE — 70450 CT HEAD/BRAIN W/O DYE: CPT

## 2020-07-08 PROCEDURE — 99285 EMERGENCY DEPT VISIT HI MDM: CPT

## 2020-07-08 PROCEDURE — 71045 X-RAY EXAM CHEST 1 VIEW: CPT

## 2020-07-08 PROCEDURE — 93306 TTE W/DOPPLER COMPLETE: CPT

## 2020-07-08 PROCEDURE — 80076 HEPATIC FUNCTION PANEL: CPT

## 2020-07-08 PROCEDURE — 36415 COLL VENOUS BLD VENIPUNCTURE: CPT

## 2020-07-08 PROCEDURE — 85025 COMPLETE CBC W/AUTO DIFF WBC: CPT

## 2020-07-08 PROCEDURE — 80048 BASIC METABOLIC PNL TOTAL CA: CPT

## 2020-07-08 PROCEDURE — 96365 THER/PROPH/DIAG IV INF INIT: CPT

## 2020-07-08 PROCEDURE — 82947 ASSAY GLUCOSE BLOOD QUANT: CPT

## 2020-07-08 PROCEDURE — 83880 ASSAY OF NATRIURETIC PEPTIDE: CPT

## 2020-07-08 PROCEDURE — 70553 MRI BRAIN STEM W/O & W/DYE: CPT

## 2020-07-08 PROCEDURE — 84484 ASSAY OF TROPONIN QUANT: CPT

## 2020-07-08 PROCEDURE — 92523 SPEECH SOUND LANG COMPREHEN: CPT

## 2020-07-08 PROCEDURE — 70549 MR ANGIOGRAPH NECK W/O&W/DYE: CPT

## 2020-07-08 PROCEDURE — 93005 ELECTROCARDIOGRAM TRACING: CPT

## 2020-07-08 PROCEDURE — 70544 MR ANGIOGRAPHY HEAD W/O DYE: CPT

## 2020-07-08 PROCEDURE — 92507 TX SP LANG VOICE COMM INDIV: CPT

## 2020-07-08 PROCEDURE — 80061 LIPID PANEL: CPT

## 2020-07-08 PROCEDURE — 83036 HEMOGLOBIN GLYCOSYLATED A1C: CPT

## 2020-07-08 RX ADMIN — SODIUM CHLORIDE SCH MLS: 0.9 INJECTION, SOLUTION INTRAVENOUS at 21:07

## 2020-07-08 RX ADMIN — METOPROLOL SUCCINATE SCH MG: 50 TABLET, EXTENDED RELEASE ORAL at 21:25

## 2020-07-08 RX ADMIN — Medication SCH ML: at 21:26

## 2020-07-08 RX ADMIN — HUMAN INSULIN SCH: 100 INJECTION, SOLUTION SUBCUTANEOUS at 21:00

## 2020-07-08 RX ADMIN — ACETAMINOPHEN PRN MG: 500 TABLET, FILM COATED ORAL at 21:25

## 2020-07-08 RX ADMIN — ATORVASTATIN CALCIUM SCH MG: 80 TABLET, FILM COATED ORAL at 21:25

## 2020-07-08 SDOH — ECONOMIC STABILITY - INCOME SECURITY: UNEMPLOYMENT, UNSPECIFIED: Z56.0

## 2020-07-08 NOTE — RAD REPORT
EXAM DESCRIPTION:  CT - Head Brain Wo Cont - 7/8/2020 4:26 pm

 

CLINICAL HISTORY:  headache;Visual disturbances

 

COMPARISON:  No comparisons

 

TECHNIQUE:  Axial 5 mm thick images of the head were obtained without IV contrast.

 

All CT scans are performed using dose optimization technique as appropriate and may include automated
 exposure control or mA/KV adjustment according to patient size.

 

FINDINGS:  No intracranial hemorrhage is present. Patient does have a large 7 x 2.5 centimeter area o
f diminished attenuation involving medial left occipital lobe. This extends partially to involve the 
posteromedial aspect of the left temporal lobe. No other areas of infarction identified. No significa
nt atrophy or chronic ischemic change. No abnormal extra-axial fluid collections. Ventricles are norm
al.

 

Mastoid air cells and visualized portions of the paranasal sinuses are clear.

 

No acute bony findings.

 

 

IMPRESSION:  Large nonhemorrhagic acute infarction involving medial left occipital lobe an a small po
rtion of the posteromedial left temporal lobe.

 

No significant mass effect. No midline shift

## 2020-07-08 NOTE — ER
Nurse's Notes                                                                                     

 [facility]                                                                 

Name: Van Justice                                                                               

Age: 60 yrs                                                                                       

Sex: Male                                                                                         

: 1960                                                                                   

MRN: [id number]                                                                                   

Arrival Date: 2020                                                                          

Time: 13:55                                                                                       

Account#: [id number]                                                                            

Bed 18                                                                                            

Private MD:                                                                                       

Diagnosis: Cerebral infarction                                                                    

                                                                                                  

Presentation:                                                                                     

                                                                                             

14:05 Chief complaint: Spouse and/or significant other states: headache, blurred vision,      ss  

      unsteady and confusion that began yesterday. Coronavirus screen: Proceed with normal        

      triage. Patient denies a cough. Patient denies shortness of breath or difficulty            

      breathing. Patient denies measured and/or subjective temperature greater than 100.4F        

      prior to today's visit. Patient denies travel on a cruise ship or to a country the [facility]      

      currently lists as an affected area. Patient denies contact with known and/or suspected     

      case of COVID-19. Ebola Screen: Patient denies exposure to infectious person. Patient       

      denies travel to an Ebola-affected area in the 21 days before illness onset. Initial        

      Sepsis Screen: Does the patient meet any 2 criteria? No. Patient's initial sepsis           

      screen is negative. Does the patient have a suspected source of infection? No.              

      Patient's initial sepsis screen is negative. Risk Assessment: Do you want to hurt           

      yourself or someone else? Patient reports no desire to harm self or others. Onset of        

      symptoms was 2020.                                                                 

14:05 Method Of Arrival: Wheelchair                                                           ss  

14:05 Acuity: MEAGHAN 3                                                                           ss  

                                                                                                  

Triage Assessment:                                                                                

17:32 Headache History: The patient has had previous headaches and this one is different than vc  

      previous episodes, and this one is more severe than previous episodes. General: Appears     

      in no apparent distress. Behavior is calm, cooperative. Pain: Complains of pain in head     

      Pain does not radiate. Pain currently is 4 out of 10 on a pain scale. Pain began 2-3        

      days ago. Also complains of photophobia, sleeplessness. Neuro: Level of Consciousness       

      is awake, alert, obeys commands, confused, Oriented to person, place, time.                 

                                                                                                  

Historical:                                                                                       

- Allergies:                                                                                      

14:08 NKA;                                                                                    ss  

- PMHx:                                                                                           

14:08 Diabetes - NIDDM; Hypertension; High Cholesterol; Myocardial infarction;                ss  

- PSHx:                                                                                           

14:08 Heart stents;                                                                           ss  

                                                                                                  

- Immunization history:: Adult Immunizations up to date.                                          

- Social history:: Smoking status: Patient denies any tobacco usage or history of.                

  Patient uses alcohol, only on a social basis.                                                   

                                                                                                  

                                                                                                  

Screenin:32 Abuse screen: Denies threats or abuse. Nutritional screening: No deficits noted.        vc  

      Tuberculosis screening: No symptoms or risk factors identified. Fall Risk None              

      identified.                                                                                 

                                                                                                  

Assessment:                                                                                       

15:45 General: Appears in no apparent distress. comfortable, obese, Behavior is calm,         vc  

      cooperative, appropriate for age. Pain: Complains of pain in forehead. Neuro: Level of      

      Consciousness is awake, alert, obeys commands, confused, Oriented to person, place,         

      time, situation, Appropriate for age. Cardiovascular: Capillary refill < 3 seconds          

      Patient's skin is warm and dry. Pulses are palpable in right radial artery and left         

      radial artery Rhythm is sinus rhythm. Respiratory: Airway is patent Respiratory effort      

      is even, unlabored, Respiratory pattern is regular, symmetrical. GI: No signs and/or        

      symptoms were reported involving the gastrointestinal system. : No signs and/or           

      symptoms were reported regarding the genitourinary system. EENT: Reports blurred vision     

      photophobia. Derm: Skin is intact, is healthy with good turgor, Skin temperature is         

      warm.                                                                                       

16:45 Reassessment: Patient appears in no apparent distress at this time. Patient and/or      vc  

      family updated on plan of care and expected duration. Pain level reassessed. Patient is     

      alert, oriented x 3, equal unlabored respirations, skin warm/dry/pink.                      

17:45 Reassessment: PATIENT AT MRI VIA WHEELCHAIR.                                            vc  

19:00 Reassessment: Patient appears in no apparent distress at this time. Patient and/or      vc  

      family updated on plan of care and expected duration. Pain level reassessed. Patient is     

      alert, oriented x 3, equal unlabored respirations, skin warm/dry/pink. BACK FROM MRI.       

                                                                                                  

Vital Signs:                                                                                      

14:05  / 72; Pulse 70; Resp 16; Temp 98.3(TE); Pulse Ox 96% ; Weight 104.33 kg; Height  ss  

      5 ft. 4 in. (162.56 cm);                                                                    

17:00  / 74; Pulse 67; Resp 17; Pulse Ox 96% on R/A;                                    vc  

19:00  / 68; Pulse 66; Resp 18; Pulse Ox 96% on R/A;                                    vc  

14:05 Body Mass Index 39.48 (104.33 kg, 162.56 cm)                                            ss  

                                                                                                  

Visual Acuity:                                                                                    

16:46 Left Eye Visual acuity 20/25, ; Right Eye Visual acuity 20/30, ; Both Eyes Visual       vc  

      acuity 20/25; Without Lenses;                                                               

                                                                                                  

NIH Stroke Scale Scores:                                                                          

16:50 NIHSS Score: 2                                                                          cp  

                                                                                                  

ED Course:                                                                                        

13:55 Patient arrived in ED.                                                                  [id number] 

14:07 Triage completed.                                                                       ss  

14:08 Arm band placed on right wrist.                                                         ss  

15:38 Carolina Peterson, RN is Primary Nurse.                                                  vc  

15:40 Patient has correct armband on for positive identification. Placed in gown. Bed in low  vc  

      position. Call light in reach. Side rails up X2. Adult w/ patient.                          

15:40 Cardiac monitor on. Pulse ox on. NIBP on.                                               vc  

15:45 Inserted saline lock: 20 gauge in right antecubital area, using aseptic technique.      vc  

      Blood collected.                                                                            

15:55 Gray Thorpe PA is PHCP.                                                                cp  

15:55 Misha Thornton MD is Attending Physician.                                              cp  

16:30 CT Head Brain wo Cont In Process Unspecified.                                           EDMS

17:02 XRAY Chest (1 view) In Process Unspecified.                                             EDMS

17:05 Joaquim Fontaine DO is Hospitalizing Provider.                                           cp  

20:15 No provider procedures requiring assistance completed. Patient admitted, IV remains in  vc  

      place.                                                                                      

                                                                                                  

Administered Medications:                                                                         

16:57 Drug: foLIC Acid 1 mg Route: IVPB; Site: right antecubital;                             vc  

17:39 Follow up: IV Status: Completed infusion; IV Intake: 50ml                               vc  

16:57 Drug: Aspirin 81 mg Route: PO;                                                          vc  

17:38 Follow up: Response: No adverse reaction                                                vc  

16:57 Drug: Tylenol 650 mg Route: PO;                                                         vc  

17:38 Follow up: Response: No adverse reaction                                                vc  

17:31 Drug: NS 0.9% 1000 ml Route: IV; Rate: 1 bolus; Site: right antecubital;                vc  

                                                                                                  

                                                                                                  

Intake:                                                                                           

17:39 IV: 50ml; Total: 50ml.                                                                  vc  

                                                                                                  

Outcome:                                                                                          

17:05 Decision to Hospitalize by Provider.                                                    cp  

20:15 Admitted to Med/surg accompanied by tech, via wheelchair, with chart.                   vc  

20:15 Condition: good                                                                             

20:15 Instructed on the need for admit.                                                           

20:17 Patient left the ED.                                                                    ea  

                                                                                                  

                                                                                                  

NIH Stroke Scale - NIH Stroke Score                                                               

Date: 2020                                                                                  

Time: 16:50                                                                                       

Total Score = 2                                                                                   

  1a. Level of Consciousness (LOC) - 0(Alert)                                                     

  1b. Level of Consciousness (LOC) (Year \T\ Age) - 0(Both)                                       

  1c. LOC Commands (Open \T\ Closes Eyes/) - 0(Both)                                          

   2. Best Gaze (Lateral Gaze Paresis) - 0(Normal)                                                

   3. Visual Field Loss - 1(Partial hemianopia)                                                   

   4. Facial Palsy - 0(Normal)                                                                    

  5a. Left Arm: Motor (10-second hold) - 0(No drift)                                              

  5b. Right Arm: Motor (10-second hold) - 0(No drift)                                             

  6a. Left Leg: Motor (5-second hold - always test supine) - 0(No drift)                          

  6b. Right Leg: Motor (5-second hold - always test supine) - 0(No drift)                         

   7. Limb Ataxia (finger/nose \T\ heel/shin - test with eyes open) - 0(Absent)                   

   8. Sensory Loss (pinprick arms/legs/face) - 0(Normal)                                          

   9. Best Language: Aphasia (description/naming/reading) - 1(Mild to moderate                    

      aphasia)                                                                                    

  10. Dysarthria (speech clarity - read or repeat words) - 0(Normal)                              

  11. Extinction and Inattention (visual/tactile/auditory/spatial/personal) - 0(No                

      abnormality)                                                                                

Initials: cp                                                                                      

                                                                                                  

Signatures:                                                                                       

Dispatcher [employer]                           Adina Trevino RN RN ss Page, Corey, PA PA cp Antunez, Elena, RN RN ea Gaskin, Ajare                                [id number]                                                  

Carolina Peterson RN RN vc                                                   

                                                                                                  

**************************************************************************************************

## 2020-07-08 NOTE — P.HP
Certification for Inpatient


Patient admitted to: Observation


With expected LOS: <2 Midnights


Patient will require the following post-hospital care: None


Practitioner: I am a practitioner with admitting privileges, knowledge of 

patient current condition, hospital course, and medical plan of care.


Services: Services provided to patient in accordance with Admission requirements

found in Title 42 Section 412.3 of the Code of Federal Regulations





<ScarlettRaffy escalante - Last Filed: 20 17:42>





Patient History


Date of Service: 20


Primary Care Provider: Dr. eubanks


Reason for admission: Acute CVA


History of Present Illness: 





60-year-old  male with medical history of diabetes mellitus type 2, 

hypertension, hyperlipidemia, myocardial infarction present to the emergency 

department with a recent history of headache, visual disturbances, expressive 

aphasia.  Patient and wife at bedside states that his symptoms began at 

approximately 3:00 p.m. on 2020.  Patient reports that he has intermittent

visual disturbances that effect his visual fields but can be overcome by turning

his head.  Patient also has been noted to have some difficulty coming up with 

his words.  Patient was evaluated in the emergency department and found to have 

a very large 7 x 2.5 cm nonhemorrhagic CVA in the medial left occipital lobe 

extending to left temporal lobe.  ED provider contacted neurology who will 

consult on the case.  Patient be admitted for further evaluation management.





When I saw the patient in the emergency department he was in no distress, 

patient's  were equal and strong, patient able to move all extremities.  

Patient does report that he has some difficulty forming his words but is able to

speak rather fluently.





- Past Medical/Surgical History


Diabetic: Yes


-: Diabetes mellitus type 2


-: Hypertension


-: Hyperlipidemia


-: CAD, Stent to LAD(2018)


-: History of Hepatitis C


-: Former tobacco use


-: Former alcohol use


-: COPD


-: Jaw surgery (broken jaw)


-: Heel surgery


-: Heart stents


Psychosocial/ Personal History: He is . He has 1 child. He does not work,

lives at home with his wife.





- Family History


  ** Mother


-: Heart disease


Notes:  suddenly from heart attack.





  ** Father


-: Liver disease


Notes: liver cancer/ alcoholic





- Social History


Smoking Status: Former smoker


Alcohol use: No


CD- Drugs: No


Caffeine use: Yes


Place of Residence: Home





<Raffy Bangura - Last Filed: 20 17:42>


Date of Service: 20





<Joaquim Fontaine - Last Filed: 20 18:04>


Allergies





No Known Allergies Allergy (Verified 18 15:35)


   





Home Medications: 








Aspirin 81 mg PO DAILY #30 tab.chew 18 


Atorvastatin Calcium [Lipitor] 80 mg PO BEDTIME #30 tab 18 


Prasugrel Hydrochloride [Effient*] 10 mg PO DAILY #30 tab 18 


Metoprolol Tartrate [Lopressor*] 50 mg PO BID #60 tab 18 


Budesonide/Formoterol Fumarate [Symbicort 80-4.5 Mcg Inhaler] 2 puff IH BID #1 

hfa.aer.ad 18 


Colchicine 0.6 mg PO BID #28 capsule 18 


Furosemide [Lasix] 20 mg PO BID #60 tablet 18 


predniSONE [Deltasone*] 10 mg PO DAILY #18 tab 18 








Review of Systems


General: Unremarkable


Eyes: Unremarkable


ENT: Unremarkable


Respiratory: Unremarkable


Cardiovascular: Unremarkable


Gastrointestinal: Unremarkable


Genitourinary: Unremarkable


Musculoskeletal: Unremarkable


Neurological: Change in Speech, Other (Visual disturbances)





<Raffy Bangura - Last Filed: 20 17:42>





Physical Examination





- Physical Exam


General: Alert, In no apparent distress, Oriented x3


HEENT: Atraumatic, Normocephalic


Neck: Supple


Respiratory: Clear to auscultation bilaterally, Normal air movement


Cardiovascular: Normal pulses, Regular rate/rhythm, Normal S1 S2


Capillary refill: <2 Seconds


Gastrointestinal: Normal bowel sounds, Soft and benign, Non-distended, No 

tenderness


Musculoskeletal: No contractures, No erythema, No tenderness


Integumentary: No tenderness/swelling, No erythema


Neurological: Normal gait, Normal strength at 5/5 x4 extr, Normal tone, 

Sensation intact, Other (Mild expressive aphasia, mild visual field loss)





- Studies


Laboratory Data (last 24 hrs)





20 16:18: PT 11.2, INR 0.95


20 16:18: WBC 8.0, Hgb 14.4, Hct 43.5, Plt Count 232


20 16:18: Sodium 142, Potassium 4.1, BUN 15, Creatinine 0.78, Glucose 232 

H, Magnesium 2.0, Total Bilirubin 0.5, AST 34, ALT 45, Alkaline Phosphatase 65








<Raffy Bangura - Last Filed: 20 17:42>





- Studies


Laboratory Data (last 24 hrs)





20 16:18: PT 11.2, INR 0.95


20 16:18: WBC 8.0, Hgb 14.4, Hct 43.5, Plt Count 232


20 16:18: Sodium 142, Potassium 4.1, BUN 15, Creatinine 0.78, Glucose 232 

H, Magnesium 2.0, Total Bilirubin 0.5, AST 34, ALT 45, Alkaline Phosphatase 65








<Joaquim Fontaine - Last Filed: 20 18:04>





Assessment and Plan





- Plan


Assessment


7 cm x 2.5 cm nonhemorrhagic CVA medial left occipital lobe extending to the 

left temporal lobe


Non-insulin-dependent diabetes mellitus with hyperglycemia


Hypertension


Hyperlipidemia


History of myocardial infarction


COPD





Plan


7 cm x 2.5 cm nonhemorrhagic CVA medial left occipital lobe extending to the 

left temporal lobe:  MRI stroke protocol has been ordered, neurology has been 

consulted.  Will also obtain echocardiogram and ultrasound of the carotids.  

Will continue with daily aspirin and Plavix, folic acid.  Will continue patient'

s home medications.  will consult physical and speech therapy.  DVT prophylaxis 

with Lovenox 40 mg subcutaneous once daily.  Anticipate improvement in the next 

24-48 hr at which time the patient be discharged.


Non-insulin-dependent diabetes mellitus with hyperglycemia:  Will continue the 

a.c. HS Accu-Cheks and sliding scale insulin therapy.  Will obtain A1c with 

morning labs.


Hypertension:  Have continued patient's metoprolol.  Will adjust as necessary.


Hyperlipidemia:  Have continue patient's atorvastatin, will adjust as necessary.


History of myocardial infarction:  Patient with history of myocardial infarction

 with stent placement.  Patient reports feeling his daily aspirin for this.


COPD:  Will provide patient with albuterol inhaler for as needed use during this

 hospitalization.  Patient does not report that he takes any inhalers on 

outpatient basis currently.


Discharge Plan: Home


Plan to discharge in: 24 Hours





- Advance Directives


Does patient have a Living Will: No


Does patient have a Durable POA for Healthcare: No





- Code Status/Comfort Care


Code Status Assessed: Yes (Patient is full code)


Time Spent Managing Pts Care (In Minutes): 55





<Attema,Raffy - Last Filed: 20 17:42>





- Plan


Case discussed at length with nurse practitioner.  Agree with evaluation, 

assessment and plan of care.  Patient will continue with aspirin, Plavix, high-

dose statin, and folic acid.  Continue DVT prophylaxis.  MRI, echo and carotid 

Doppler to be obtained.  Will discuss further with Neurology.  Continue blood 

pressure medication.  Will provide sliding scale.  Will reassess.





<Joaquim Fontaine - Last Filed: 20 18:04>

## 2020-07-08 NOTE — RAD REPORT
EXAM DESCRIPTION:  RAD - Chest Single View - 7/8/2020 5:01 pm

 

CLINICAL HISTORY:  dizziness, blurred vision, stroke chest film

 

COMPARISON:  August 2018 Two view chest

 

TECHNIQUE:  AP portable chest image was obtained 7/8/2020 5:01 pm .

 

FINDINGS:  Lung volumes are low. Body habitus and low lung volumes accentuate heart, vasculature and 
lung markings. Mild failure or volume overload could be masked. Heart and vasculature are normal. No 
measurable pleural effusion and no pneumothorax. No acute bony abnormality seen. No acute aortic find
ings suspected.

 

IMPRESSION:  Limited portable study showing no focal mass or consolidation.

 

Mild edema or infiltrate could be masked in this setting.

## 2020-07-08 NOTE — RAD REPORT
EXAM DESCRIPTION:  MRI - MRA Neck W/Wo Cont - 7/8/2020 6:50 pm

 

CLINICAL HISTORY:

Acute CVA

 

TECHNIQUE:  MR angiography of the cervical vasculature performed. Coronal imaging plane acquisition u
tilized. A 20 MultiHance contrast volume was utilized. Coronal reformatted images were generated and 
reviewed. Vertical axis 3D rotational projections obtained using maximum intensity projection protoco
l.

 

FINDINGS:  Aortic arch is 3 vessel with no origins stenosis. Bilateral vertebral artery origins are u
nremarkable. The vertebral arteries are codominant. No vertebral artery or basilar artery suspicious 
finding.

 

The bilateral common carotid arteries show no significant disease. Internal carotid artery's are tort
uous but show no stenosis, dissection or other suspicious finding from origin to skullbase.

 

Bilateral subclavian arteries show no suspicious finding.

 

IMPRESSION:  MRA neck examination shows no significant or suspicious finding.

## 2020-07-08 NOTE — RAD REPORT
EXAM DESCRIPTION:  MRI - MRA Head Wo Cont - 7/8/2020 6:50 pm

 

CLINICAL HISTORY:  Acute CVA, blurred vision, homonymous hemianopia

 

COMPARISON:  MRI brain same date, CT head same date

 

TECHNIQUE:  Axial and coronal 3D time-of-flight image acquisition was performed. 3D rotational images
 were generated with source and reconstruction images reviewed. Horizontal and vertical axis rotation
al views generated using MIP protocol.

 

FINDINGS:  Major venous sinuses are patent. Distal vertebral arteries are unremarkable. Narrowing of 
the distal left vertebral artery is seen as normal variant rather than indication of vascular disease
. No basilar artery stenosis or suspicious finding. There is acute truncation of the left posterior c
erebral artery at the P1 - P2 segment junction. Right posterior cerebral artery is normal.

 

Right internal carotid artery shows no stenosis, dissection or significant finding. Supraclinoid port
ion of the left internal carotid artery shows atherosclerotic change and luminal narrowing. Anterior 
cerebral arteries are unremarkable. The left A1 YINA segment is absent or very small is a normal varia
nt. Left YINA supply from the right-side circulation. The right middle cerebral artery shows no signif
icant finding. Irregular contour is seen in the left middle cerebral artery M1 segment.

 

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 This is th
e occluded vessel causing the left occipital-temporal infarction.

 

Atherosclerotic changes are evident in the left internal carotid artery supraclinoid portion continui
ng into the left middle cerebral artery M1 segment. These vessels are considered at risk for future t
hromboembolic event.

## 2020-07-08 NOTE — RAD REPORT
EXAM DESCRIPTION:  MRI - Brain W/Wo Cont - 7/8/2020 6:50 pm

 

CLINICAL HISTORY:  Confused; Headache

 

COMPARISON:  MRA Head Wo Cont dated 7/8/2020; MRA Neck W/Wo Cont dated 7/8/2020

 

TECHNIQUE:  Sagittal and axial T1-weighted images were obtained. Axial PD/heavily T2-weighted and T2-
FLAIR images were obtained along with axial DWI/ADC mapping sequences.  Coronal heavily T2 weighted s
equence obtained.  Axial and coronal post-contrast T1-weighted images were also obtained. A 20 ml Mul
tihance contrast following utilized.

 

FINDINGS:  Diffusion-weighted imaging shows a large area of abnormal signal involving the medial port
ion of the left occipital lobe extending anteriorly into the posteromedial left temporal lobe. There 
is corresponding diminished signal on ADC mapping. There is corresponding hyperintense T2/IR signal. 
Diminished signal in cortical edema changes are present on the T1 sequencing. A small punctate diffus
ion signal abnormality is present in the posterolateral left thalamus. This also has corresponding di
minished signal on ADC mapping and increased T2 signal. No abnormalities of the right cerebral hemisp
here. Brainstem, cerebellum and basal ganglia are spared.

 

 There is some localized edema from this large area of infarction. However, there is no significant m
ass effect. No midline shift. No intracranial hemorrhage changes are present. Post-contrast view show
 no abnormal enhancement in the infarctions zone. No abnormal dural thickening or enhancement. Ventri
cles are unremarkable.

 

Mastoid air cells and paranasal sinuses are clear.

 

 

IMPRESSION:  Enlarged nonhemorrhagic infarction is present in the medial occipital lobe and posterome
dial temporal lobe on the left. This is the vascular distribution of the left posterior cerebral carmen
ry.

 

Questionable punctate focus along the posterolateral left thalamus. This may be from small perforatin
g branch from the P1 segment of the left posterior cerebral artery or a posterior communicating arter
y .

 

There is localized edema at the infarction zone but no significant mass-effect, midline shift or smiley
gent finding.

## 2020-07-09 LAB
BUN BLD-MCNC: 13 MG/DL (ref 7–18)
GLUCOSE SERPLBLD-MCNC: 151 MG/DL (ref 74–106)
HCT VFR BLD CALC: 40.6 % (ref 39.6–49)
HDLC SERPL-MCNC: 43 MG/DL (ref 40–60)
LDLC SERPL CALC-MCNC: 67 MG/DL (ref ?–130)
LYMPHOCYTES # SPEC AUTO: 2.1 K/UL (ref 0.7–4.9)
PMV BLD: 9.2 FL (ref 7.6–11.3)
POTASSIUM SERPL-SCNC: 3.9 MMOL/L (ref 3.5–5.1)
RBC # BLD: 4.7 M/UL (ref 4.33–5.43)
TSH SERPL DL<=0.05 MIU/L-ACNC: 2.18 UIU/ML (ref 0.36–3.74)

## 2020-07-09 RX ADMIN — HUMAN INSULIN SCH: 100 INJECTION, SOLUTION SUBCUTANEOUS at 07:30

## 2020-07-09 RX ADMIN — METOPROLOL SUCCINATE SCH: 50 TABLET, EXTENDED RELEASE ORAL at 06:00

## 2020-07-09 RX ADMIN — ASPIRIN SCH MG: 81 TABLET, COATED ORAL at 08:38

## 2020-07-09 RX ADMIN — FOLIC ACID SCH MG: 1 TABLET ORAL at 08:38

## 2020-07-09 RX ADMIN — HUMAN INSULIN SCH UNIT: 100 INJECTION, SOLUTION SUBCUTANEOUS at 17:15

## 2020-07-09 RX ADMIN — Medication SCH ML: at 20:22

## 2020-07-09 RX ADMIN — Medication SCH ML: at 08:39

## 2020-07-09 RX ADMIN — ACETAMINOPHEN PRN MG: 500 TABLET, FILM COATED ORAL at 01:23

## 2020-07-09 RX ADMIN — SODIUM CHLORIDE SCH MLS: 0.9 INJECTION, SOLUTION INTRAVENOUS at 20:24

## 2020-07-09 RX ADMIN — ATORVASTATIN CALCIUM SCH MG: 80 TABLET, FILM COATED ORAL at 20:21

## 2020-07-09 RX ADMIN — METOPROLOL SUCCINATE SCH: 50 TABLET, EXTENDED RELEASE ORAL at 17:16

## 2020-07-09 RX ADMIN — HUMAN INSULIN SCH UNIT: 100 INJECTION, SOLUTION SUBCUTANEOUS at 12:03

## 2020-07-09 RX ADMIN — CLOPIDOGREL BISULFATE SCH MG: 75 TABLET, FILM COATED ORAL at 08:38

## 2020-07-09 RX ADMIN — TOPIRAMATE SCH MG: 25 TABLET, COATED ORAL at 20:20

## 2020-07-09 RX ADMIN — SODIUM CHLORIDE SCH MLS: 0.9 INJECTION, SOLUTION INTRAVENOUS at 12:03

## 2020-07-09 RX ADMIN — ENOXAPARIN SODIUM SCH MG: 40 INJECTION SUBCUTANEOUS at 08:38

## 2020-07-09 RX ADMIN — HYDROCODONE BITARTRATE AND ACETAMINOPHEN PRN TAB: 10; 325 TABLET ORAL at 20:20

## 2020-07-09 RX ADMIN — HUMAN INSULIN SCH UNIT: 100 INJECTION, SOLUTION SUBCUTANEOUS at 21:00

## 2020-07-09 NOTE — ECHO
HEIGHT: 5 ft 4 in   WEIGHT: 230 lb 0 oz   DATE OF STUDY: 7/9/2020   REFER DR: Raffy Bangura NP

2-DIMENSIONAL: YES

     M.MODE: YES

 DOPPLER: YES

COLOR FLOW: YES



                    TDS:  NO

PORTABLE: NO

 DEFINITY:  NO

BUBBLE STUDY: NO





DIAGNOSIS:  STROKE



CARDIAC HISTORY:  

CATHERIZATION: NO

SURGERY: NO

PROSTHETIC VALVE: NO

PACEMAKER: NO





MEASUREMENTS (cm)

    DIASTOLIC (NORMALS)                 SYSTOLIC (NORMALS)

IVSd                 0.9 (0.6-1.2)                    LA Diam 4.4 (1.9-4.0)     LVEF       
  40-45%  

LVIDd               6.5 (3.5-5.7)                        LVIDs      4.3 (2.0-3.5)     %FS  
        35%

LVPWd             1.1. (0.6-1.2)

Ao Diam           2.6 (2.0-3.7)



2 DIMENSIONAL ASSESSMENT:

RIGHT ATRIUM:                   NORMAL

LEFT ATRIUM:       ENLARGED



RIGHT VENTRICLE:            NORMAL

LEFT VENTRICLE: DILATED



TRICUSPID VALVE:             NORMAL

MITRAL VALVE:     MILD MR



PULMONIC VALVE:             MILD PI

AORTIC VALVE:     MILDY CALCIFIED



PERICARDIAL EFFUSION: TRACE

AORTIC ROOT:      NORMAL





LEFT VENTRICULAR WALL MOTION:     ANTEROSEPTAL AKINESIS.



DOPPLER/COLOR FLOW:     MILD PULMONARY INSUFFICIENCY, MILD MITRAL REGURGITATION.



COMMENTS:      NORMAL LEFT VENTRICULAR EJECTION FRACTION 55-60%. NORMAL WALL MOTION. MILD 
LEFT ATRIAL ENLARGEMENT.



TECHNOLOGIST:   AALIYAH UHGO

## 2020-07-09 NOTE — CON
Reason For Consultation:  Consultation was called because of acute stroke.



History Of Present Illness:  Mr. Justice is a 60-year-old right-handed  patient with multip
le medical problems including diabetes mellitus type 2, hypertension, dyslipidemia, myocardial infarc
tion, status post left anterior descending artery stent in July of 2018, who comes in with visual dis
turbances and headache and expressive aphasia.  However, he had symptoms on the 7th of July around 3 
p.m. but did not immediately seek medical attention.  He did not come to the hospital the following d
ay and was therefore way out of the window for tissue plasminogen activator.  His CT scan identified 
a large 7 x 2.5 cm nonhemorrhagic stroke in the medial left occipital lobe extending into the left te
mporal lobe and coming off the left posterior cerebral artery.  The patient's clinical deficits which
 at that time was largely a right homonymous hemianopsia and not an aphasia as he had largely resolve
d and had an NIH stroke scale of 2 in the emergency room.  He had stopped all of his anti-platelet me
dications about 1 year ago after he had a stent done.  Emergency room has given aspirin, Plavix, foli
c acid, and started on high dose statin with some permissive hypertension.  Subsequent brain MRA show
ed an acute truncation of the left posterior cerebral artery at the T1-T2 junction and this was ident
ified as the etiology of his left occipital temporal infarction.  Furthermore, arthrosclerotic change
s were seen in the left internal carotid artery supraclinoid portion continuing into the left middle 
cerebral artery M1 segment and these were felt to be at significant risk for future thromboembolic st
rokes.



Past Medical History:  As indicated, in addition to hepatitis, COPD.



Surgical History:  Surgery after broken jaw, surgery on the heel, and multiple cardiac stents.



Social History:  He reports smoking, alcohol, tobacco up to year, actually had his myocardial infarct
ion 2 years ago.  He is  with 1 child and lives at home.  He is not working.



Family History:  Positive for heart disease in mother and she had a myocardial infarction that led to
 her death.  Also in father liver disease, cancer, and alcoholic.



Allergies:  NO KNOWN DRUG ALLERGIES.



Current Medications:  He has Norco for pain as 10/325, Ventolin inhaler, aspirin 162 mg daily, Lipito
r 80 mg at bedtime, Plavix 75 mg daily, Lovenox 40 mg subcutaneously daily, folic acid 1 mg daily.  H
e has insulin sliding scale.  Toprol 50 mg twice daily to be held if blood pressure is less than 170.
  Zofran 4 mg every 6 hours as needed.  He did receive a bolus of fluid in the emergency room and he 
had another 500 cc of normal saline.  He is also on Topamax started now 25 mg twice a day and tramado
l 50 mg every 4-6 hours as needed.



Review of Systems:

He denies any recent fevers or chills, nausea, vomiting, any myalgias or arthralgias, aside from head
ache after stroke.  No prior headaches.  No other positives on a 10-point systems review.



Physical Examination:

Vital Signs:  Blood pressure 108/87, pulse of 65, respiratory rate 18, temperature 98.4, oxygen satur
ation 98% on room air.  Weight 230 pounds, height 5 feet 4 inches, BMI 39.5. 

GENERAL:  Mr. Justice is resting in bed.  He does have a headache rated at 4/10.  

HEENT:  He is otherwise normocephalic, atraumatic.  Sclerae anicteric.  Oropharynx is pink and moist.
 

Neck:  Supple. 

Chest:  Clear. 

Heart:  Regular. 

Extremities:  Show no edema, cyanosis, or clubbing.  He does have an obese abdomen. 

Neurological:  He is alert and oriented to situation, place, person.  Follows all commands appropriat
ely.  His cranial nerves 2 through 12 show a right homonymous hemianopsia.  Otherwise intact and his 
facial sensation intact to light touch bilaterally.  The patient is symmetric with equal excursions b
ilaterally.  His tongue and palate are in the midline and shoulder shrug is 5/5.  His motor examinati
on upper and lower extremities 5/5 proximally and distally.  Sensory exam, mild stocking-glove loss t
o light touch, temperature in the legs and arms.  Coordination intact in upper and lower extremities.
  Gait, he will be ambulated with the day physical therapist.



Laboratory Studies:  Complete blood count with differential is normal.  Coagulation panel shows INR 0
.95.  Chemistries are all unremarkable; however, his glucose is elevated to 255.  His hemoglobin A1c 
9.6.  Calcium after hydration is low at 8.4.  Beta natriuretic peptide 1473.  Albumin is 3.2.  His LD
L cholesterol 67, HDL cholesterol 43, TSH 2.18.  His chest x-ray shows mild edema.  His neck magnetic
 resonance angiogram is unremarkable.



Assessment:  Mr. Justcie is a 60-year-old right-handed patient with multiple stroke risk factors inc
luding hypertension, diabetes, dyslipidemia, prior myocardial infarction with stents in the cardiac v
essels, who comes in with a large 7 x 2.5 cm left posterior cerebral artery stroke and also he has in
 internal carotid artery and middle cerebral artery stenosis that is significant in the left brain.  
He is at risk for thromboembolic strokes in the left hemisphere and anterior portion of the brain.  H
is clinical deficits while limited to visual deficits now may worsen depending on the association are
as to impact the language and he is at risk of personal barrier in addition to having an MCA stroke w
hich could be more devastating.



Plan:  

1.He should have permissive hypertension.  Blood pressure should be kept elevated.  He is quite belo
w.  He will receive IV fluids.  Hold blood pressure medicine unless his blood pressure is greater carmelo
n 170 and will keep it this way for the next 3 days.

2.He should continue again with the medications as indicated.  Aspirin and Plavix along with the Lip
itor and folic acid and DVT prophylaxis.

3.He should have a 4-vessel angiogram and potential for intracerebral arterial stenting that may be 
done in [place] or other procedures as appropriate.

4.He should be evaluated by Physical, Occupational, and Speech Therapy for potential candidate for r
ehabilitation, which may include outpatient speech and/or physical therapy to help with hand-eye coor
dination, holding objects while ambulating or mobilizing.

5.He should not drive or operate heavy machinery given his large right homonymous hemianopsia.  He m
ay require reevaluation with certification before returning to driving.

6.He should be followed in Dr. Abad's clinic 1 month after his discharge.





FARIDEH/AYANNA

DD:  07/09/2020 13:40:35Voice ID:  [id number]

DT:  07/09/2020 17:09:24Report ID:  [id number]

## 2020-07-09 NOTE — RAD REPORT
EXAM DESCRIPTION:  US - CP - 7/9/2020 3:32 pm

 

CLINICAL HISTORY:  CVA

Headache, drowsiness, CVA symptomology

 

COMPARISON:  MRA Neck W/Wo Cont dated 7/8/2020

 

TECHNIQUE:  Real-time sonographic evaluation of both carotid systems was performed. Doppler interroga
tion was performed with waveform tracing bilaterally.

 

FINDINGS:  Normal high resistance waveforms are noted in both external carotid arteries. The common c
arotid arteries and internal carotid arteries show normal low resistance waveforms.

 

No significant plaque formation is seen. Peak systolic and end diastolic velocity values and the ICA/
CCA ratios are in the non-hemodynamically significant range.

 

Antegrade flow seen in both vertebral arteries.

 

IMPRESSION:  No significant atherosclerotic changes noted.

 

No evidence of a hemodynamically significant stenosis.

## 2020-07-09 NOTE — P.PN
Subjective


Date of Service: 07/09/20


Primary Care Provider: Dr. eubanks


Chief Complaint: Acute CVA


Subjective: No new changes





Patient is doing well today.  No new complaints.





Review of Systems


General: Unremarkable


Eyes: Unremarkable


ENT: Unremarkable


Respiratory: Unremarkable


Cardiovascular: Unremarkable


Gastrointestinal: Unremarkable


Genitourinary: Unremarkable


Musculoskeletal: Unremarkable


Integumentary: Unremarkable


Neurological: As per HPI


Lymphatics: Unremarkable





Physical Examination





- Vital Signs


Temperature: 98.2 F


Blood Pressure: 98/67


Pulse: 66


Respirations: 18


Pulse Ox (%): 97





- Physical Exam


General: Alert, In no apparent distress, Oriented x3


HEENT: Atraumatic, Normocephalic


Neck: Supple


Respiratory: Clear to auscultation bilaterally, Normal air movement


Cardiovascular: No edema, Normal S1 S2


Capillary refill: <2 Seconds


Gastrointestinal: Normal bowel sounds


Musculoskeletal: No contractures, No erythema, No tenderness


Integumentary: No erythema


Neurological: Abnormal speech (Mild expressive aphasia and visual field cut.)





- Studies


Laboratory Data (last 24 hrs)





07/08/20 16:18: PT 11.2, INR 0.95


07/08/20 16:18: WBC 8.0, Hgb 14.4, Hct 43.5, Plt Count 232


07/08/20 16:18: Sodium 142, Potassium 4.1, BUN 15, Creatinine 0.78, Glucose 232 

H, Magnesium 2.0, Total Bilirubin 0.5, AST 34, ALT 45, Alkaline Phosphatase 65








Assessment & Plan


Discharge Plan: Home


Plan to discharge in: 24 Hours





- Code Status/Comfort Care


Code Status Assessed: Yes (Patient is full code)


Physician Review Additional Text: 





Assessment


7 cm x 2.5 cm nonhemorrhagic CVA medial left occipital lobe extending to the 

left temporal lobe


Non-insulin-dependent diabetes mellitus with hyperglycemia


Hypertension


Hyperlipidemia


History of myocardial infarction


COPD





Plan


7 cm x 2.5 cm nonhemorrhagic CVA medial left occipital lobe extending to the 

left temporal lobe:  MRI stroke protocol has been completed and does identify 

the vessel with stroke occurred.  Anticipate patient will see Neurology today.  

Pending completion of echocardiogram and ultrasound of the carotids..  Will 

continue with daily aspirin and Plavix, folic acid.  Will continue patient's 

home medications.  Awaiting  consult with physical and speech therapy.  DVT 

prophylaxis with Lovenox 40 mg subcutaneous once daily.  Anticipate improvement 

in the next 24-48 hr at which time the patient be discharged.


Non-insulin-dependent diabetes mellitus with hyperglycemia:  Will continue the 

a.c. HS Accu-Cheks and sliding scale insulin therapy.  Patient's A1c is elevated

at 9.6.  Will review all medications and possibly after just a discharge.


Hypertension:  Have continued patient's metoprolol.  Will adjust as necessary.


Hyperlipidemia:  Have continue patient's atorvastatin, will adjust as necessary.


History of myocardial infarction:  Patient with history of myocardial infarction

with stent placement.  Patient reports feeling his daily aspirin for this.


COPD:  Will provide patient with albuterol inhaler for as needed use during this

hospitalization.  Patient does not report that he takes any inhalers on 

outpatient basis currently.


Critical Care: No


Time Spent Managing Pts Care (In Minutes): 55

## 2020-07-09 NOTE — RAD REPORT
EXAM DESCRIPTION:  CT - Head Brain Wo Cont - 7/9/2020 5:07 pm

 

CLINICAL HISTORY:  R/O hemorrhagic stroke

CVA, headache and drowsiness

 

COMPARISON:  Head Brain Wo Cont dated 7/8/2020

 

TECHNIQUE:  All CT scans are performed using dose optimization technique as appropriate and may inclu
de automated exposure control or mA/KV adjustment according to patient size.

 

FINDINGS:  Area of diminished density is again seen left occipital lobe compatible with left PCA terr
itory infarct. No evidence of hemorrhagic conversion.No hydrocephalus.No areas of brain edema or evid
ence of midline shift.

 

The paranasal sinuses and mastoids are clear. The calvarium is intact.

 

IMPRESSION:  Subacute left posterior cerebral artery territory infarct noted without evidence of hemo
rrhagic conversion.

## 2020-07-10 VITALS — OXYGEN SATURATION: 95 %

## 2020-07-10 VITALS — SYSTOLIC BLOOD PRESSURE: 122 MMHG | DIASTOLIC BLOOD PRESSURE: 68 MMHG | TEMPERATURE: 98 F

## 2020-07-10 LAB
BUN BLD-MCNC: 9 MG/DL (ref 7–18)
GLUCOSE SERPLBLD-MCNC: 161 MG/DL (ref 74–106)
POTASSIUM SERPL-SCNC: 3.8 MMOL/L (ref 3.5–5.1)

## 2020-07-10 RX ADMIN — ENOXAPARIN SODIUM SCH MG: 40 INJECTION SUBCUTANEOUS at 08:44

## 2020-07-10 RX ADMIN — CLOPIDOGREL BISULFATE SCH MG: 75 TABLET, FILM COATED ORAL at 08:43

## 2020-07-10 RX ADMIN — Medication SCH ML: at 08:45

## 2020-07-10 RX ADMIN — HYDROCODONE BITARTRATE AND ACETAMINOPHEN PRN TAB: 10; 325 TABLET ORAL at 08:44

## 2020-07-10 RX ADMIN — HYDROCODONE BITARTRATE AND ACETAMINOPHEN PRN TAB: 10; 325 TABLET ORAL at 04:32

## 2020-07-10 RX ADMIN — HUMAN INSULIN SCH: 100 INJECTION, SOLUTION SUBCUTANEOUS at 07:30

## 2020-07-10 RX ADMIN — TOPIRAMATE SCH MG: 25 TABLET, COATED ORAL at 08:44

## 2020-07-10 RX ADMIN — ASPIRIN SCH MG: 81 TABLET, COATED ORAL at 08:43

## 2020-07-10 RX ADMIN — FOLIC ACID SCH MG: 1 TABLET ORAL at 08:44

## 2020-07-10 RX ADMIN — METOPROLOL SUCCINATE SCH: 50 TABLET, EXTENDED RELEASE ORAL at 06:00

## 2020-07-10 RX ADMIN — HYDROCODONE BITARTRATE AND ACETAMINOPHEN PRN TAB: 10; 325 TABLET ORAL at 00:44

## 2020-07-10 RX ADMIN — HUMAN INSULIN SCH UNIT: 100 INJECTION, SOLUTION SUBCUTANEOUS at 12:00

## 2020-07-10 NOTE — P.DS
Admission Date: 07/08/20


Discharge Date: 07/10/20


Primary Care Provider: Dr. Russ


Disposition: DC HOME/HOME HEALTH CARE


Discharge Condition: GOOD


Reason for Admission: Acute CVA


Consultations: 





Neurology-Dr. Abad





Procedures: 





ECHO: 


Ejection fraction 40-45%


LEFT VENTRICULAR WALL MOTION:     ANTEROSEPTAL AKINESIS.


DOPPLER/COLOR FLOW:     MILD PULMONARY INSUFFICIENCY, MILD MITRAL REGURGITATION.


COMMENTS:      NORMAL LEFT VENTRICULAR EJECTION FRACTION 55-60%. NORMAL WALL 

MOTION. MILD LEFT ATRIAL ENLARGEMENT.





MRI Brain: 


FINDINGS:  Diffusion-weighted imaging shows a large area of abnormal signal 

involving the medial portion of the left occipital lobe extending anteriorly 

into the posteromedial left temporal lobe. There is corresponding diminished 

signal on ADC mapping. There is corresponding hyperintense T2/IR signal. 

Diminished signal in cortical edema changes are present on the T1 sequencing. A 

small punctate diffusion signal abnormality is present in the posterolateral 

left thalamus. This also has corresponding diminished signal on ADC mapping and 

increased T2 signal. No abnormalities of the right cerebral hemisphere. 

Brainstem, cerebellum and basal ganglia are spared. 


 There is some localized edema from this large area of infarction. However, 

there is no significant mass effect. No midline shift. No intracranial 

hemorrhage changes are present. Post-contrast view show no abnormal enhancement 

in the infarctions zone. No abnormal dural thickening or enhancement. Ventricles

are unremarkable. 


Mastoid air cells and paranasal sinuses are clear. 


IMPRESSION:  Enlarged nonhemorrhagic infarction is present in the medial 

occipital lobe and posteromedial temporal lobe on the left. This is the vascular

distribution of the left posterior cerebral artery. 


Questionable punctate focus along the posterolateral left thalamus. This may be 

from small perforating branch from the P1 segment of the left posterior cerebral

artery or a posterior communicating artery . 


There is localized edema at the infarction zone but no significant mass-effect, 

midline shift or emergent finding.





MRA Brain: 


FINDINGS:  Major venous sinuses are patent. Distal vertebral arteries are 

unremarkable. Narrowing of the distal left vertebral artery is seen as normal 

variant rather than indication of vascular disease. No basilar artery stenosis 

or suspicious finding. There is acute truncation of the left posterior cerebral 

artery at the P1 - P2 segment junction. Right posterior cerebral artery is 

normal. 


Right internal carotid artery shows no stenosis, dissection or significant 

finding. Supraclinoid portion of the left internal carotid artery shows 

atherosclerotic change and luminal narrowing. Anterior cerebral arteries are 

unremarkable. The left A1 YINA segment is absent or very small is a normal 

variant. Left YINA supply from the right-side circulation. The right middle 

cerebral artery shows no significant finding. Irregular contour is seen in the 

left middle cerebral artery M1 segment. 


IMPRESSION:  Acute truncation of the left posterior cerebral artery at the P1-P2

junction. This is the occluded vessel causing the left occipital-temporal 

infarction. 


Atherosclerotic changes are evident in the left internal carotid artery 

supraclinoid portion continuing into the left middle cerebral artery M1 segment.

These vessels are considered at risk for future thromboembolic event.





MRA Neck: 


FINDINGS:  Aortic arch is 3 vessel with no origins stenosis. Bilateral vertebral

artery origins are unremarkable. The vertebral arteries are codominant. No 

vertebral artery or basilar artery suspicious finding. 


The bilateral common carotid arteries show no significant disease. Internal 

carotid artery's are tortuous but show no stenosis, dissection or other 

suspicious finding from origin to skullbase. 


Bilateral subclavian arteries show no suspicious finding. 


IMPRESSION:  MRA neck examination shows no significant or suspicious finding.





Repeat CT scan: 


FINDINGS:  Area of diminished density is again seen left occipital lobe 

compatible with left PCA territory infarct. No evidence of hemorrhagic 

conversion.No hydrocephalus.No areas of brain edema or evidence of midline shif

t. 


The paranasal sinuses and mastoids are clear. The calvarium is intact. 


IMPRESSION:  Subacute left posterior cerebral artery territory infarct noted 

without evidence of hemorrhagic conversion.





Medical problem list: 


7 cm x 2.5 cm nonhemorrhagic CVA medial left occipital lobe extending to the 

left temporal lobe


Non-insulin-dependent diabetes mellitus with hyperglycemia


Hypertension


Hyperlipidemia


History of myocardial infarction


COPD


Diabetes mellitus type 2 non-insulin-dependent


Brief History of Present Illness: 





60-year-old  male with multiple medical problems including diabetes 

mellitus type 2, hypertension, hyperlipidemia, CTA with prior stent presented 

with visual disturbances, headaches and expressive aphasia.  CT scan revealed 

large 7 x 2.5 cm nonhemorrhagic stroke in the medial left occipital lobe 

extending into the left temporal lobe and coming off the left posterior cerebral

artery.  Clinical deficits include right home ominous hemianopsia.  Patient was 

admitted for further evaluation and treatment.


Hospital Course: 





Patient presented with visual disturbances, dizziness.  Patient found to have 7 

cm by 2.5 cm nonhemorrhagic CVA to the medial left occipital lobe extending to 

the left temporal lobe.  Repeat CT scan shows no extension or conversion to 

hemorrhagic CVA.  Neurology was consulted.  Patient has done well during the 

course of his stay.  Neurology recommends discharge at this time with home 

health and physical therapy.  At discharge patient will continue with aspirin 81

mg daily, Plavix 75 mg daily, folic acid 1 mg daily, and Lipitor 40 mg daily.  

Recommend follow up with neurology within 1 week to follow up this 

hospitalization.  Patient will require a 4 vessel angiogram for potential intra 

cerebral arterial stent in the near future.  This will have to be done in 

[place].  Neurology will help make arrangements for this.  Home health and 

physical therapy will be arranged prior to discharge. Due to his right 

homonymous hemianopsia, should not drive or operate heavy machinery.  He may 

require we evaluation with certification before it returning to drive.  It is 

also recommended that he should have permissive hypertension.  Blood pressure 

should be kept elevated greater than 170 over the next 3 days.  Thereafter 

recommend to maintain blood pressure around 140-150 systolic. 





Patient with hypertension.  Recommend to maintain blood pressure systolic above 

170 over the next 3 days.  Therefore hold blood pressure medication if systolic 

greater than 170 over the next 3 days.  After 3 days patient will continue with 

metformin 50 mg 1 pill twice daily.  Further adjustment in medication can be 

done by his PCP within the next week to further monitor and address.





Patient with hyperlipidemia.  As mentioned above patient will continue with 

Lipitor 80 mg daily.





Patient with CAD with prior MI.  As recommended above patient will continue with

aspirin, metoprolol and Plavix.  Recommend follow up with cardiology as 

directed.





Patient with COPD.  At discharge will provide Symbicort 2 puffs twice daily and 

Pro air 2 puffs 3 times a day as needed for shortness of breath.  Recommend 

follow up and establish care with pulmonology to further monitor and address. 





Patient with diabetes mellitus type 2 non-insulin-dependent.  A1c 9.6.  

Medications have been adjusted for better control.  At discharge patient will 

continue with his current medications of glyburide 5 mg twice daily and 

metformin ER 1000 mg 1 pill twice daily.  Recommend to maintain blood sugar less

140 fasting less drink after meals.  Further adjustment can be done by his PCP.


Vital Signs/Physical Exam: 














Temp Pulse Resp BP Pulse Ox


 


 98 F   73   18   122/68   98 


 


 07/10/20 12:00  07/10/20 12:00  07/10/20 12:00  07/10/20 12:00  07/10/20 12:00








General: Alert, In no apparent distress, Oriented x3, Cooperative


HEENT: Atraumatic, Other (Visual disturbance noted by patient)


Respiratory: Clear to auscultation bilaterally, Normal air movement


Cardiovascular: Normal pulses, Regular rate/rhythm


Gastrointestinal: Normal bowel sounds, No masses, No rebound, No guarding


Musculoskeletal: No erythema, No tenderness, No warmth


Integumentary: No tenderness/swelling, No erythema, No warmth, No cyanosis


Neurological: Normal strength at 5/5 x4 extr, Normal affect


Laboratory Data at Discharge: 














WBC  8.2 K/uL (4.3-10.9)   07/09/20  05:20    


 


Hgb  13.4 g/dL (13.6-17.9)  L  07/09/20  05:20    


 


Hct  40.6 % (39.6-49.0)   07/09/20  05:20    


 


Plt Count  192 K/uL (152-406)   07/09/20  05:20    


 


PT  11.2 SECONDS (9.5-12.5)   07/08/20  16:18    


 


INR  0.95   07/08/20  16:18    


 


Sodium  140 mmol/L (136-145)   07/10/20  05:24    


 


Potassium  3.8 mmol/L (3.5-5.1)   07/10/20  05:24    


 


BUN  9 mg/dL (7-18)   07/10/20  05:24    


 


Creatinine  0.56 mg/dL (0.55-1.3)   07/10/20  05:24    


 


Glucose  161 mg/dL ()  H  07/10/20  05:24    


 


Magnesium  2.0 mg/dL (1.8-2.4)   07/08/20  16:18    


 


Total Bilirubin  0.5 mg/dL (0.2-1.0)   07/08/20  16:18    


 


AST  34 U/L (15-37)   07/08/20  16:18    


 


ALT  45 U/L (12-78)   07/08/20  16:18    


 


Alkaline Phosphatase  65 U/L ()   07/08/20  16:18    


 


Triglycerides  124 mg/dL (<150)   07/09/20  05:20    


 


Cholesterol  135 mg/dL (<200)   07/09/20  05:20    


 


HDL Cholesterol  43 mg/dL (40-60)   07/09/20  05:20    


 


Cholesterol/HDL Ratio  3.14   07/09/20  05:20    








Home Medications: 








Albuterol Sulfate [Proair Hfa] 2 puff IH BID PRN #1 hfa.aer.ad 07/10/20 


Aspirin 81 mg PO DAILY #30 tab.chew 07/10/20 


Atorvastatin Calcium [Lipitor] 80 mg PO BEDTIME #30 tab 07/10/20 


Budesonide/Formoterol Fumarate [Symbicort 160-4.5 Mcg Inhaler] 2 puff IH BID #1 

hfa.aer.ad 07/10/20 


Clopidogrel Bisulfate [Plavix*] 75 mg PO DAILY #30 tablet 07/10/20 


Folic Acid 1 mg PO DAILY #90 tablet 07/10/20 


Metformin ER [Glucophage ER*] 100 mg PO BID #120 tab.sa 07/10/20 


Metoprolol Succinate [Toprol Xl*] 50 mg PO BID 6AM 6PM #60 tab 07/10/20 


Metoprolol Tartrate [Lopressor*] 50 mg PO BID #60 tab 07/10/20 


glyBURIDE [Glyburide] 5 mg PO BID #60 07/10/20 





New Medications: 


Aspirin 81 mg PO DAILY #30 tab.chew


Folic Acid 1 mg PO DAILY #90 tablet


Metformin ER [Glucophage ER*] 100 mg PO BID #120 tab.sa


glyBURIDE [Glyburide] 5 mg PO BID #60


Atorvastatin Calcium [Lipitor] 80 mg PO BEDTIME #30 tab


Metoprolol Tartrate [Lopressor*] 50 mg PO BID #60 tab


Clopidogrel Bisulfate [Plavix*] 75 mg PO DAILY #30 tablet


Albuterol Sulfate [Proair Hfa] 2 puff IH BID PRN #1 hfa.aer.ad


 PRN Reason: Shortness Of Breath


Budesonide/Formoterol Fumarate [Symbicort 160-4.5 Mcg Inhaler] 2 puff IH BID #1 

hfa.aer.ad


Metoprolol Succinate [Toprol Xl*] 50 mg PO BID 6AM 6PM #60 tab


Patient Discharge Instructions: 1.  Follow up with PCP to follow up 

hospitalization.  2.  Patient presented with visual disturbances, dizziness.  P

atient found to have 7 cm by 2.5 cm nonhemorrhagic CVA to the medial left 

occipital lobe extending to the left temporal lobe.  Repeat CT scan shows no 

extension or conversion to hemorrhagic CVA.  Neurology was consulted.  Patient 

has done well during the course of his stay.  Neurology recommends discharge at 

this time with home health and physical therapy.  At discharge patient will 

continue with aspirin 81 mg daily, Plavix 75 mg daily, folic acid 1 mg daily, 

and Lipitor 40 mg daily.  Recommend follow up with neurology within 1 week to 

follow up this hospitalization.  Patient will require a 4 vessel angiogram for 

potential intra cerebral arterial stent in the near future.  This will have to 

be done in [place].  Neurology will help make arrangements for this.  Home 

health and physical therapy will be arranged prior to discharge. Due to his 

right homonymous hemianopsia, should not drive or operate heavy machinery.  He 

may require we evaluation with certification before it returning to drive.  It 

is also recommended that he should have permissive hypertension.  Blood pressure

should be kept elevated greater than 170 over the next 3 days.  Thereafter 

recommend to maintain blood pressure around 140-150 systolic.  3.  Patient with 

hypertension.  Recommend to maintain blood pressure systolic above 170 over the 

next 3 days.  Therefore hold blood pressure medication if systolic greater than 

170 over the next 3 days.  After 3 days patient will continue with metformin 50 

mg 1 pill twice daily.  Further adjustment in medication can be done by his PCP 

within the next week to further monitor and address.  4.  Patient with 

hyperlipidemia.  As mentioned above patient will continue with Lipitor 80 mg 

daily.  5.  Patient with CAD with prior MI.  As recommended above patient will 

continue with aspirin, metoprolol and Plavix.  Recommend follow up with 

cardiology as directed.  6.  Patient with COPD.  At discharge will provide 

Symbicort 2 puffs twice daily and Pro air 2 puffs 3 times a day as needed for 

shortness of breath.  Recommend follow up and establish care with pulmonology to

further monitor and address.  7.  Patient with diabetes mellitus type 2 

non-insulin-dependent.  A1c 9.6.  Medications have been adjusted for better 

control.  At discharge patient will continue with his current medications of 

glyburide 5 mg twice daily and metformin ER 1000 mg 1 pill twice daily.  Tung

mmend to maintain blood sugar less 140 fasting less drink after meals.  Further 

adjustment can be done by his PCP.


Diet: ADA


Activity: Ad josefa


Time spent managing pt's care (in minutes): 55

## 2020-08-07 ENCOUNTER — HOSPITAL ENCOUNTER (OUTPATIENT)
Dept: HOSPITAL 97 - OR | Age: 60
Discharge: HOME | End: 2020-08-07
Attending: SURGERY
Payer: COMMERCIAL

## 2020-08-07 VITALS — SYSTOLIC BLOOD PRESSURE: 113 MMHG | TEMPERATURE: 97.3 F | DIASTOLIC BLOOD PRESSURE: 58 MMHG | OXYGEN SATURATION: 98 %

## 2020-08-07 DIAGNOSIS — E11.9: ICD-10-CM

## 2020-08-07 DIAGNOSIS — Z11.59: ICD-10-CM

## 2020-08-07 DIAGNOSIS — I10: ICD-10-CM

## 2020-08-07 DIAGNOSIS — H53.9: ICD-10-CM

## 2020-08-07 DIAGNOSIS — R51: Primary | ICD-10-CM

## 2020-08-07 PROCEDURE — 03BT0ZX EXCISION OF LEFT TEMPORAL ARTERY, OPEN APPROACH, DIAGNOSTIC: ICD-10-PCS

## 2020-08-07 PROCEDURE — 82947 ASSAY GLUCOSE BLOOD QUANT: CPT

## 2020-08-07 PROCEDURE — 88305 TISSUE EXAM BY PATHOLOGIST: CPT

## 2020-08-07 PROCEDURE — 37609 LIGATION/BX TEMPORAL ARTERY: CPT

## 2020-08-07 RX ADMIN — LIDOCAINE HYDROCHLORIDE ONE ML: 10 INJECTION, SOLUTION EPIDURAL; INFILTRATION; INTRACAUDAL; PERINEURAL at 09:57

## 2020-08-07 RX ADMIN — CEFAZOLIN ONE MLS: 1 INJECTION, POWDER, FOR SOLUTION INTRAMUSCULAR; INTRAVENOUS; PARENTERAL at 10:20

## 2020-08-07 RX ADMIN — LIDOCAINE HYDROCHLORIDE ONE ML: 10 INJECTION, SOLUTION EPIDURAL; INFILTRATION; INTRACAUDAL; PERINEURAL at 10:30

## 2020-08-07 RX ADMIN — CEFAZOLIN ONE MLS: 1 INJECTION, POWDER, FOR SOLUTION INTRAMUSCULAR; INTRAVENOUS; PARENTERAL at 09:57

## 2020-08-07 NOTE — OP
Date of Procedure:  08/07/2020



Surgeon:  Paul Lawrence MD



Assistant:  HARSH Perez.



Preoperative Diagnosis:  Left-sided headache, vision changes, rule out temporal arteritis.



Postoperative Diagnosis:  Left-sided headache, vision changes, rule out temporal arteritis.



Procedure:  Left temporal artery biopsy and utilization of Doppler.



Estimated Blood Loss:  Minimal.



Specimen:  Left temporal artery.



Findings:  As above.



Anesthesia:  MAC.



Complications:  None.



Condition:  The patient tolerated the procedure in stable condition, taken to Recovery in good genera
l condition.



Procedure In Detail:  The patient was brought to the OR and placed in supine position.  MAC anesthesi
a was begun.  The patient was prepped and draped in the usual sterile fashion.  Lidocaine 1% infiltra
rhianna locally after the Doppler device was used to identify the branch of the temporal artery.  Then, a
 4 cm incision was made.  Subcutaneous tissue was divided.  Proximal and distal part of the artery id
entified and clamped and divided between clamps, 4 cm segment sent to Pathology.  4-0 silk was used t
o tie off both ends.  Wound irrigated, bleeding controlled with cautery.  4-0 chromic used to approxi
mate the subcutaneous tissue and close the skin.  Sterile dressing applied.  The patient was awakened
 and taken to Recovery in good general condition.



Discharge Note:  The patient will go to Day Surgery and home when stable.



Disposition:  Home.



Condition:  Stable.



Discharge Instructions:  Resume home medications and diet.  Activity as tolerated.  No heavy lifting.
  Remove outer dressing in 2 days.  Shower.  Keep wound clean and dry.  Keep Steri-Strips on at all t
imes.  Follow up in my office in 2 weeks.  Call for appointment.  Follow up with Dr. Carrasoc.  Tylenol 
No. 3 one tablet p.o. q.4 p.r.n. pain.





AS/MODL

DD:  08/07/2020 11:09:02Voice ID:  [id number]

DT:  08/07/2020 21:00:24Report ID:  [id number]

## 2021-09-24 LAB
ALBUMIN SERPL BCP-MCNC: 3.7 G/DL (ref 3.4–5)
ALP SERPL-CCNC: 69 U/L (ref 45–117)
ALT SERPL W P-5'-P-CCNC: 59 U/L (ref 12–78)
AST SERPL W P-5'-P-CCNC: 23 U/L (ref 15–37)
BUN BLD-MCNC: 14 MG/DL (ref 7–18)
GLUCOSE SERPLBLD-MCNC: 276 MG/DL (ref 74–106)
HCT VFR BLD CALC: 41.8 % (ref 39.6–49)
INR BLD: 0.91
LYMPHOCYTES # SPEC AUTO: 2.7 K/UL (ref 0.7–4.9)
PMV BLD: 8.5 FL (ref 7.6–11.3)
POTASSIUM SERPL-SCNC: 4.9 MMOL/L (ref 3.5–5.1)
RBC # BLD: 4.9 M/UL (ref 4.33–5.43)
UA DIPSTICK W REFLEX MICRO PNL UR: (no result)

## 2021-09-24 NOTE — RAD REPORT
EXAM DESCRIPTION:  Brigitte Marroquin And Kizzy (2 Views)9/24/2021 10:38 am

 

CLINICAL HISTORY:  Preop for knee surgery. Hypertension

 

COMPARISON:  2020

 

FINDINGS:   The lungs appear clear of acute infiltrate. The heart is borderline enlarged. Old left cl
avicular fracture

 

IMPRESSION:   No acute abnormalities displayed

## 2021-09-29 ENCOUNTER — HOSPITAL ENCOUNTER (OUTPATIENT)
Dept: HOSPITAL 97 - OR | Age: 61
Setting detail: OBSERVATION
LOS: 2 days | Discharge: HOME HEALTH | End: 2021-10-01
Attending: ORTHOPAEDIC SURGERY | Admitting: ORTHOPAEDIC SURGERY
Payer: COMMERCIAL

## 2021-09-29 VITALS — BODY MASS INDEX: 40.6 KG/M2

## 2021-09-29 VITALS — OXYGEN SATURATION: 98 %

## 2021-09-29 DIAGNOSIS — Z91.19: ICD-10-CM

## 2021-09-29 DIAGNOSIS — M17.12: Primary | ICD-10-CM

## 2021-09-29 DIAGNOSIS — Z95.5: ICD-10-CM

## 2021-09-29 DIAGNOSIS — E11.59: ICD-10-CM

## 2021-09-29 DIAGNOSIS — Z79.4: ICD-10-CM

## 2021-09-29 DIAGNOSIS — E66.9: ICD-10-CM

## 2021-09-29 DIAGNOSIS — E78.5: ICD-10-CM

## 2021-09-29 DIAGNOSIS — I25.10: ICD-10-CM

## 2021-09-29 DIAGNOSIS — Z79.82: ICD-10-CM

## 2021-09-29 DIAGNOSIS — Z82.49: ICD-10-CM

## 2021-09-29 DIAGNOSIS — E11.65: ICD-10-CM

## 2021-09-29 DIAGNOSIS — Z80.0: ICD-10-CM

## 2021-09-29 DIAGNOSIS — I25.2: ICD-10-CM

## 2021-09-29 DIAGNOSIS — Z86.19: ICD-10-CM

## 2021-09-29 DIAGNOSIS — Z20.822: ICD-10-CM

## 2021-09-29 DIAGNOSIS — Z79.02: ICD-10-CM

## 2021-09-29 DIAGNOSIS — Z87.891: ICD-10-CM

## 2021-09-29 DIAGNOSIS — I10: ICD-10-CM

## 2021-09-29 DIAGNOSIS — J44.9: ICD-10-CM

## 2021-09-29 DIAGNOSIS — Z82.3: ICD-10-CM

## 2021-09-29 DIAGNOSIS — Z86.73: ICD-10-CM

## 2021-09-29 DIAGNOSIS — K80.20: ICD-10-CM

## 2021-09-29 PROCEDURE — 82947 ASSAY GLUCOSE BLOOD QUANT: CPT

## 2021-09-29 PROCEDURE — 81003 URINALYSIS AUTO W/O SCOPE: CPT

## 2021-09-29 PROCEDURE — 80053 COMPREHEN METABOLIC PANEL: CPT

## 2021-09-29 PROCEDURE — 0SRD069 REPLACEMENT OF LEFT KNEE JOINT WITH OXIDIZED ZIRCONIUM ON POLYETHYLENE SYNTHETIC SUBSTITUTE, CEMENTED, OPEN APPROACH: ICD-10-PCS

## 2021-09-29 PROCEDURE — 36415 COLL VENOUS BLD VENIPUNCTURE: CPT

## 2021-09-29 PROCEDURE — 97530 THERAPEUTIC ACTIVITIES: CPT

## 2021-09-29 PROCEDURE — 86850 RBC ANTIBODY SCREEN: CPT

## 2021-09-29 PROCEDURE — 97116 GAIT TRAINING THERAPY: CPT

## 2021-09-29 PROCEDURE — 85610 PROTHROMBIN TIME: CPT

## 2021-09-29 PROCEDURE — 86900 BLOOD TYPING SEROLOGIC ABO: CPT

## 2021-09-29 PROCEDURE — 85018 HEMOGLOBIN: CPT

## 2021-09-29 PROCEDURE — 88304 TISSUE EXAM BY PATHOLOGIST: CPT

## 2021-09-29 PROCEDURE — 85730 THROMBOPLASTIN TIME PARTIAL: CPT

## 2021-09-29 PROCEDURE — 71046 X-RAY EXAM CHEST 2 VIEWS: CPT

## 2021-09-29 PROCEDURE — 97161 PT EVAL LOW COMPLEX 20 MIN: CPT

## 2021-09-29 PROCEDURE — 85014 HEMATOCRIT: CPT

## 2021-09-29 PROCEDURE — 94010 BREATHING CAPACITY TEST: CPT

## 2021-09-29 PROCEDURE — 97139 UNLISTED THERAPEUTIC PX: CPT

## 2021-09-29 PROCEDURE — 85025 COMPLETE CBC W/AUTO DIFF WBC: CPT

## 2021-09-29 PROCEDURE — 88311 DECALCIFY TISSUE: CPT

## 2021-09-29 PROCEDURE — 86901 BLOOD TYPING SEROLOGIC RH(D): CPT

## 2021-09-29 PROCEDURE — 27447 TOTAL KNEE ARTHROPLASTY: CPT

## 2021-09-29 RX ADMIN — CEFAZOLIN SCH MLS: 1 INJECTION, POWDER, FOR SOLUTION INTRAMUSCULAR; INTRAVENOUS; PARENTERAL at 18:29

## 2021-09-29 RX ADMIN — HUMAN INSULIN SCH UNIT: 100 INJECTION, SOLUTION SUBCUTANEOUS at 21:13

## 2021-09-29 NOTE — P.HP
Certification for Inpatient


Patient admitted to: Observation


With expected LOS: <2 Midnights


Practitioner: I am a practitioner with admitting privileges, knowledge of 

patient current condition, hospital course, and medical plan of care.


Services: Services provided to patient in accordance with Admission requirements

found in Title 42 Section 412.3 of the Code of Federal Regulations





Patient History


Date of Service: 09/29/21


Reason for admission: I WAS CALLED IN TO MANAGE DIABETES


History of Present Illness: 





MR. ALCOCER IS A DIABETIC WITH VASCULAR CHANGES WHO DOES NOT WANT TO CHANGE 

DIET FOR BETTER FOR DIABETIC HAD KNEE SURGERY BY DR. SOTO.  HE IS MY OFFICE

PATIENT SO DR. LEMUS ASKED ME TO TAKE CARE OF HIS DIABETES INSTEAD OF HOSPITAL 

DOCTORS.  HE IS NOT A COMPLIANT DIABETIC.  HE CAN'T AFFORD ANY OF THE [facility] MEDS 

SO WE ARE STUCK WITH NOVOLIN 70/30 AND NO OTHER MEDS LIKE SGLT2 INHIBITERS OR 

GLP1 INHIBITORS. 


Allergies





No Known Allergies Allergy (Verified 09/29/21 06:58)


   





Home medications list reviewed: Yes


Home Medications: 








Aspirin 81 mg PO DAILY #30 tab.chew 07/10/20 


Atorvastatin Calcium [Lipitor] 80 mg PO BEDTIME #30 tab 07/10/20 


Clopidogrel Bisulfate [Plavix*] 75 mg PO DAILY #30 tablet 07/10/20 


Metoprolol Succinate [Toprol Xl*] 50 mg PO BID 6AM 6PM #60 tab 07/10/20 


Metoprolol Tartrate [Lopressor*] 50 mg PO BID #60 tab 07/10/20 


Metformin ER [Glucophage ER*] 500 mg PO BID 08/05/20 


Topiramate [Trokendi Xr] 25 mg PO DAILYPRN PRN 08/05/20 


Amitriptyline HCl 10 mg PO BEDTIME 09/24/21 


Insulin Aspart [Insulin Aspart Flexpen] 100 unit SQ SEECOM 09/24/21 








- Past Medical/Surgical History


Has patient received pneumonia vaccine in the past: No


Diabetic: Yes


-: Diabetes mellitus type 2


-: Hypertension


-: Hyperlipidemia


-: CAD, Stent to LAD(7/2018)


-: History of Hepatitis C


-: Former tobacco use


-: Former alcohol use


-: COPD


-: Jaw surgery (broken jaw)


-: Heel surgery


-: Heart stents


-: Right ankle (screws in place)


Psychosocial/ Personal History: He is . He has 1 child. He does not work,

lives at home with his wife.





- Family History


  ** Mother


-: Heart disease


Notes: heart attack





  ** Father


-: Stroke, Cancer, Liver disease


Notes: liver cancer/ alcoholic





- Social History


Smoking Status: Former smoker


Alcohol use: Yes


CD- Drugs: No


Caffeine use: No


Place of Residence: Home





Review of Systems


10-point ROS is otherwise unremarkable





Physical Examination





- Vital Signs


Temperature: 97.0 F


Blood Pressure: 111/65


Pulse: 82


Respirations: 20


Pulse Ox (%): 95





- Physical Exam


General: Mild distress, Obese


HEENT: Atraumatic, PERRLA, Mucous membr. moist/pink, EOMI, Sclerae nonicteric


Neck: Supple, 2+ carotid pulse no bruit, No LAD, Without JVD or thyroid 

abnormality


Respiratory: Clear to auscultation bilaterally, Normal air movement


Cardiovascular: Regular rate/rhythm, Normal S1 S2


Gastrointestinal: Normal bowel sounds, No tenderness


Musculoskeletal: No tenderness


Integumentary: No rashes


Neurological: Normal gait, Normal speech, Normal strength at 5/5 x4 extr, Normal

tone, Normal affect


Lymphatics: No axilla or inguinal lymphadenopathy





Assessment and Plan





- Problems (Diagnosis)


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


Current Visit: Yes   Status: Chronic   


Plan: 


HE AND WIFE KNOW THAT WEIGHT LOSS AND BETTER DIET MOST DIABETES MEDICINES WILL 

FAIL AND HE WILL CONTINUE TO HAVE COMPLICATIONS OF DIABETES.  HE CHOSES NOT TO 

FOLLOW DIABETIC DIET.  WIFE IS WRPOKING ON GETTING FREE OZEMPIC FO [facility] AS COST 

IS TOO HIGH. 





FOR GLUCOSE , HE WILL GET 20 OF REGULAR INSULIN AND 40 OF LANTUS WHILE HE 

IS HERE. 








(2) Cholelithiasis


Onset Date: 08/13/18   Current Visit: No   Status: Chronic   


Qualifiers: 


 





- Advance Directives


Does patient have a Living Will: No


Does patient have a Durable POA for Healthcare: Yes

## 2021-09-29 NOTE — OP
Date of Procedure:  09/29/2021



Surgeon:  Gray Pinon MD



Preoperative Diagnosis:  Left knee arthritis.



Postoperative Diagnosis:  Left knee arthritis.



Procedure:  Left total knee arthroplasty using the Biomet Vanguard system.



Estimated Blood Loss:  100 mL.



Complications:  There were no complications.



Specimens:  No pathology specimens.



Indications For Operation:  Mr. Justice is a 61-year-old male, who unfortunately has had a CVA as we
ll as a heart attack and slightly overweight and is having a lot of difficulty with mobility because 
of the significant pain and problems related to his left knee.  X-rays demonstrate severe arthritis i
n his knee and despite conservative care, he is unable to go without pain.  Risks, benefits, and alte
rnatives to total knee arthroplasty were discussed with him.  He states he understands things as pres
ented and wished to proceed.



Description Of Procedure:  The patient was taken to the operating room and placed in supine position.
  General anesthesia was obtained by staff.  Following this, a well-padded tourniquet was placed on s
uperior left thigh.  Left lower extremity was then prepped and draped in usual sterile fashion proced
ure.  Following this, the leg was then elevated, bent, and tourniquet was raised.  A standard anterio
r incision was then taken down carefully through the skin and soft tissues.  Meticulous hemostasis be
ing maintained using Bovie electrocautery.  This leads down to the appropriate level, which was explo
ited to allow for exposure of the extensor mechanism.  A standard medial parapatellar arthrotomy was 
then performed with liberation approximately 20 mL of rather normal-appearing synovial fluid.  This w
as followed by resection of the lateral meniscus and medial meniscus.  The anterior cruciate ligament
 had been previously torn is not localized.  After this, attention was then turned to the femur.  An 
intramedullary alignment guide was then placed.  Distal cut was made.  It was then appropriately size
d using Biomet Sizer.  The remainder of the femoral cuts were then made without difficulty.  After th
is, attention was turned to the tibia.  Tibial cut was then made without difficulty and any debris wa
s removed.  The trial femur and tibia were then placed.  It was brought down to full extension.  Atte
ntion was then turned to the patella.  It was then calipered and cut with the trial patella being daljit
cheri.  The knee appeared to be symmetrical, may be a slightly bit looser on the lateral aspect and med
ial aspect, but only a tiny bit very small medial collateral recession was obtained.  This was follow
ed by placement of a box placed with a bone plug and punching of the tibia.  The final components wit
h the exception of the final polyethylene were then cemented in place.  All unsupported cement was re
moved.  This was followed by taking the knee through full range of motion and the patella glides exce
llently.  It was found to be balanced, may be a tiny bit looser laterally than medial, although did n
ot feel that we should step of the poly higher.  After this, polyp removed.  It was copiously irrigat
ed and some small osteophytes were removed from the medial tibial plateau.  The final polyethylene wa
s placed and the extensor mechanism repaired back in a watertight fashion using heavy Ethibond suture
s.  This was followed by closure of skin using Vicryl followed by staples.  The patient was then plac
ed in a well-padded sterile dressing, awakened, and taken to recovery room in good condition.  There 
were no complications.





SE/MODL

DD:  09/29/2021 09:50:11Voice ID:  [id number]

DT:  09/29/2021 10:07:38Report ID:  [id number]

## 2021-09-29 NOTE — P.BOP
Preoperative diagnosis: left knee arthritis


Postoperative diagnosis: same


Primary procedure: left total knee arthoplasty


Estimated blood loss: 100ccs


Anesthesia: General


Complications: None


Transferred to: Recovery Room


Condition: Good

## 2021-09-30 LAB — HCT VFR BLD CALC: 34.7 % (ref 39.6–49)

## 2021-09-30 RX ADMIN — HUMAN INSULIN SCH UNIT: 100 INJECTION, SOLUTION SUBCUTANEOUS at 08:45

## 2021-09-30 RX ADMIN — HYDROCODONE BITARTRATE AND ACETAMINOPHEN PRN TAB: 5; 325 TABLET ORAL at 14:39

## 2021-09-30 RX ADMIN — INSULIN GLARGINE SCH UNITS: 100 INJECTION, SOLUTION SUBCUTANEOUS at 20:56

## 2021-09-30 RX ADMIN — ENOXAPARIN SODIUM SCH MG: 30 INJECTION SUBCUTANEOUS at 17:16

## 2021-09-30 RX ADMIN — CEFAZOLIN SCH MLS: 1 INJECTION, POWDER, FOR SOLUTION INTRAMUSCULAR; INTRAVENOUS; PARENTERAL at 08:47

## 2021-09-30 RX ADMIN — METOPROLOL TARTRATE SCH MG: 50 TABLET, FILM COATED ORAL at 20:54

## 2021-09-30 RX ADMIN — HUMAN INSULIN SCH UNIT: 100 INJECTION, SOLUTION SUBCUTANEOUS at 13:12

## 2021-09-30 RX ADMIN — ASPIRIN 81 MG SCH MG: 81 TABLET ORAL at 08:46

## 2021-09-30 RX ADMIN — METFORMIN HYDROCHLORIDE SCH MG: 500 TABLET, EXTENDED RELEASE ORAL at 20:54

## 2021-09-30 RX ADMIN — CEFAZOLIN SCH MLS: 1 INJECTION, POWDER, FOR SOLUTION INTRAMUSCULAR; INTRAVENOUS; PARENTERAL at 01:19

## 2021-09-30 RX ADMIN — METOPROLOL SUCCINATE SCH MG: 50 TABLET, EXTENDED RELEASE ORAL at 07:21

## 2021-09-30 RX ADMIN — HUMAN INSULIN SCH UNIT: 100 INJECTION, SOLUTION SUBCUTANEOUS at 20:55

## 2021-09-30 RX ADMIN — HYDROCODONE BITARTRATE AND ACETAMINOPHEN PRN TAB: 5; 325 TABLET ORAL at 20:52

## 2021-09-30 RX ADMIN — METOPROLOL TARTRATE SCH MG: 50 TABLET, FILM COATED ORAL at 08:46

## 2021-09-30 RX ADMIN — METFORMIN HYDROCHLORIDE SCH MG: 500 TABLET, EXTENDED RELEASE ORAL at 08:47

## 2021-09-30 RX ADMIN — CLOPIDOGREL BISULFATE SCH MG: 75 TABLET, FILM COATED ORAL at 08:47

## 2021-09-30 RX ADMIN — ENOXAPARIN SODIUM SCH MG: 30 INJECTION SUBCUTANEOUS at 07:05

## 2021-09-30 RX ADMIN — METOPROLOL SUCCINATE SCH MG: 50 TABLET, EXTENDED RELEASE ORAL at 17:16

## 2021-09-30 RX ADMIN — HUMAN INSULIN SCH UNIT: 100 INJECTION, SOLUTION SUBCUTANEOUS at 15:57

## 2021-09-30 RX ADMIN — HUMAN INSULIN SCH UNIT: 100 INJECTION, SOLUTION SUBCUTANEOUS at 12:08

## 2021-09-30 NOTE — P.PN
Subjective


Date of Service: 09/30/21


Chief Complaint: I WAS CALLED IN TO MANAGE DIABETES


Subjective: Improving





HIS GLUCOSE WENT  TODAY. HE IS GIVEN PLAIN INSULIN AND LANTUS.  GLUCOSE IS

DOWN  NOW.  HE WILL GO HOME IN AM.  DR PITTS IS OKAY WITH ASPIRIN AND

PLAVIX FOR DVT PREVENTION AND DOES NOT WANT TO ADD XARELTO. 





Physical Examination





- Vital Signs


Temperature: 98.1 F


Blood Pressure: 113/57


Pulse: 72


Respirations: 20


Pulse Ox (%): 97





- Physical Exam


General: Oriented x3, Mild distress, Obese


HEENT: Atraumatic, PERRLA, EOMI


Neck: Supple, JVD not distended


Respiratory: Clear to auscultation bilaterally, Normal air movement


Cardiovascular: Regular rate/rhythm, Normal S1 S2


Gastrointestinal: Normal bowel sounds, No tenderness


Musculoskeletal: No tenderness


Integumentary: No rashes


Neurological: Normal speech, Normal tone, Normal affect


Lymphatics: No axilla or inguinal lymphadenopathy





- Studies


Laboratory Data (last 24 hrs)





09/30/21 11:44: Glucose 513 H*


09/30/21 05:58: Hgb 11.8 L, Hct 34.7 L D


09/29/21 21:25: Glucose 585 H*





Medications List Reviewed: Yes





Assessment And Plan





- Current Problems (Diagnosis)


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


Current Visit: Yes   Status: Chronic   


Plan: 


HE AND WIFE KNOW THAT WEIGHT LOSS AND BETTER DIET MOST DIABETES MEDICINES WILL 

FAIL AND HE WILL CONTINUE TO HAVE COMPLICATIONS OF DIABETES.  HE CHOSES NOT TO 

FOLLOW DIABETIC DIET.  WIFE IS WORKING ON GETTING FREE OZEMPIC FO [facility] AS COST 

IS TOO HIGH. 





FOR GLUCOSE , HE WILL GET 20 OF REGULAR INSULIN AND 40 OF LANTUS WHILE HE 

IS HERE. 








HE SHOULD BE CONTROLLED AT HOME IF HE WOULD FOLLOW DIET AND TAKE MEDS LIKE 

OZEMPIC.  HE CAN'T TOLERATE SGLT2 INHIBITORS. 


PROGNOSIS REMAINS GUARDED. 








(2) Cholelithiasis


Onset Date: 08/13/18   Current Visit: No   Status: Chronic   


Qualifiers:

## 2021-10-01 VITALS — TEMPERATURE: 97 F | SYSTOLIC BLOOD PRESSURE: 105 MMHG | DIASTOLIC BLOOD PRESSURE: 68 MMHG

## 2021-10-01 LAB — HCT VFR BLD CALC: 33.3 % (ref 39.6–49)

## 2021-10-01 RX ADMIN — METFORMIN HYDROCHLORIDE SCH MG: 500 TABLET, EXTENDED RELEASE ORAL at 08:14

## 2021-10-01 RX ADMIN — HUMAN INSULIN SCH: 100 INJECTION, SOLUTION SUBCUTANEOUS at 08:17

## 2021-10-01 RX ADMIN — HYDROCODONE BITARTRATE AND ACETAMINOPHEN PRN TAB: 5; 325 TABLET ORAL at 08:52

## 2021-10-01 RX ADMIN — METOPROLOL SUCCINATE SCH: 50 TABLET, EXTENDED RELEASE ORAL at 06:00

## 2021-10-01 RX ADMIN — CLOPIDOGREL BISULFATE SCH MG: 75 TABLET, FILM COATED ORAL at 08:14

## 2021-10-01 RX ADMIN — HUMAN INSULIN SCH: 100 INJECTION, SOLUTION SUBCUTANEOUS at 05:00

## 2021-10-01 RX ADMIN — METOPROLOL TARTRATE SCH: 50 TABLET, FILM COATED ORAL at 08:16

## 2021-10-01 RX ADMIN — ASPIRIN 81 MG SCH MG: 81 TABLET ORAL at 08:14

## 2021-10-01 RX ADMIN — ENOXAPARIN SODIUM SCH MG: 30 INJECTION SUBCUTANEOUS at 06:43

## 2021-10-01 RX ADMIN — HUMAN INSULIN SCH: 100 INJECTION, SOLUTION SUBCUTANEOUS at 01:00

## 2021-10-01 RX ADMIN — HYDROCODONE BITARTRATE AND ACETAMINOPHEN PRN TAB: 5; 325 TABLET ORAL at 02:05

## 2021-10-01 RX ADMIN — INSULIN GLARGINE SCH UNITS: 100 INJECTION, SOLUTION SUBCUTANEOUS at 08:15

## 2021-10-01 NOTE — P.DS
Admission Date: 09/29/21


Discharge Date: 10/01/21


Disposition: ROUTINE DISCHARGE


Discharge Condition: FAIR


Reason for Admission: I WAS CALLED IN TO MANAGE DIABETES





- Problems


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


Status: Chronic   





(2) Cholelithiasis


Onset Date: 08/13/18   Status: Chronic   


Qualifiers: 


 


Brief History of Present Illness: 





MR. ALCOCER IS A DIABETIC WITH VASCULAR CHANGES WHO DOES NOT WANT TO CHANGE 

DIET FOR BETTER FOR DIABETIC HAD KNEE SURGERY BY DR. PINON.  HE IS MY OFFICE

PATIENT SO DR. LEMUS ASKED ME TO TAKE CARE OF HIS DIABETES INSTEAD OF HOSPITAL 

DOCTORS.  HE IS NOT A COMPLIANT DIABETIC.  HE CAN'T AFFORD ANY OF THE MEW MEDS 

SO WE ARE STUCK WITH NOVOLIN 70/30 AND NO OTHER MEDS LIKE SGLT2 INHIBITERS OR 

GLP1 INHIBITORS. 


Hospital Course: 





MR. AMES HAD KNEE SURGERY.  HIS GLUCOSE WENT  WITH MEDS WE WERE ABLE TO

BRING IT DOWN .  IT IS POSSIBLE THAT IF HE TAKES INSULIN AND EATS BETTER 

HE CAN CONTROL GLUCOSE.  IT IS ALL UPTO HIM.  IF HE DOES NOT LOSE WEIGHT HE WILL

HAVE MORE DIABETIC COMPLICATIONS. WIFE AND HIM ARE AWARE.  I DISCOVERED TODAY 

THAT HE IS TAKING NOVOLIN 70/30 ONLY ONCE A DAY.  HE HAS BEEN ADVISED TO TAKE IT

BID BEFORE. 


HE IS STABLE FOR DC.  DR. CASTRO DID NTO WANT TO START XARELTO AS HE IS ON ASA

AND PLAVIX.


Vital Signs/Physical Exam: 














Temp Pulse Resp BP Pulse Ox


 


 97 F   67   18   105/68   99 


 


 10/01/21 08:00  10/01/21 08:16  10/01/21 09:52  10/01/21 08:16  10/01/21 09:52








Laboratory Data at Discharge: 














WBC  9.80 K/uL (4.3-10.9)   09/24/21  10:04    


 


Hgb  11.3 g/dL (13.6-17.9)  L  10/01/21  06:01    


 


Hct  33.3 % (39.6-49.0)  L  10/01/21  06:01    


 


Plt Count  246 K/uL (152-406)   09/24/21  10:04    


 


PT  10.4 SECONDS (9.5-12.5)   09/24/21  10:04    


 


INR  0.91   09/24/21  10:04    


 


APTT  26.9 SECONDS (24.3-36.9)   09/24/21  10:04    


 


Sodium  140 mmol/L (136-145)   09/24/21  10:04    


 


Potassium  4.9 mmol/L (3.5-5.1)   09/24/21  10:04    


 


BUN  14 mg/dL (7-18)   09/24/21  10:04    


 


Creatinine  0.75 mg/dL (0.55-1.3)   09/24/21  10:04    


 


Glucose  513 mg/dL ()  H*  09/30/21  11:44    


 


Total Bilirubin  0.4 mg/dL (0.2-1.0)   09/24/21  10:04    


 


AST  23 U/L (15-37)   09/24/21  10:04    


 


ALT  59 U/L (12-78)   09/24/21  10:04    


 


Alkaline Phosphatase  69 U/L ()   09/24/21  10:04    








Home Medications: 








Aspirin 81 mg PO DAILY #30 tab.chew 07/10/20 


Atorvastatin Calcium [Lipitor] 80 mg PO BEDTIME #30 tab 07/10/20 


Clopidogrel Bisulfate [Plavix*] 75 mg PO DAILY #30 tablet 07/10/20 


Metoprolol Succinate [Toprol Xl*] 50 mg PO BID 6AM 6PM #60 tab 07/10/20 


Metoprolol Tartrate [Lopressor*] 50 mg PO BID #60 tab 07/10/20 


Metformin ER [Glucophage ER*] 500 mg PO BID 08/05/20 


Topiramate [Trokendi Xr] 25 mg PO DAILYPRN PRN 08/05/20 


Amitriptyline HCl 10 mg PO BEDTIME 09/24/21 


Insulin Aspart [Insulin Aspart Flexpen] 100 unit SQ SEECOM 09/24/21 





Followup: 


Osmin Carrasco MD [ACTIVE - CAN ADMIT] - 


Gray Pinon MD [ACTIVE - CAN ADMIT] -